# Patient Record
Sex: MALE | Race: WHITE | NOT HISPANIC OR LATINO | Employment: OTHER | URBAN - METROPOLITAN AREA
[De-identification: names, ages, dates, MRNs, and addresses within clinical notes are randomized per-mention and may not be internally consistent; named-entity substitution may affect disease eponyms.]

---

## 2019-02-25 ENCOUNTER — OFFICE VISIT (OUTPATIENT)
Dept: ORTHOPEDIC SURGERY | Facility: CLINIC | Age: 69
End: 2019-02-25

## 2019-02-25 VITALS — TEMPERATURE: 97.1 F | WEIGHT: 171 LBS | HEIGHT: 66 IN | BODY MASS INDEX: 27.48 KG/M2

## 2019-02-25 DIAGNOSIS — M75.02 ADHESIVE CAPSULITIS OF LEFT SHOULDER: ICD-10-CM

## 2019-02-25 DIAGNOSIS — M25.512 CHRONIC LEFT SHOULDER PAIN: Primary | ICD-10-CM

## 2019-02-25 DIAGNOSIS — Z00.00 PATIENT NEEDS MEDICAL HOLD FOR EVALUATION: ICD-10-CM

## 2019-02-25 DIAGNOSIS — G89.29 CHRONIC LEFT SHOULDER PAIN: Primary | ICD-10-CM

## 2019-02-25 PROCEDURE — 73030 X-RAY EXAM OF SHOULDER: CPT | Performed by: ORTHOPAEDIC SURGERY

## 2019-02-25 PROCEDURE — 99204 OFFICE O/P NEW MOD 45 MIN: CPT | Performed by: ORTHOPAEDIC SURGERY

## 2019-02-25 PROCEDURE — 20610 DRAIN/INJ JOINT/BURSA W/O US: CPT | Performed by: ORTHOPAEDIC SURGERY

## 2019-02-25 RX ORDER — MELOXICAM 15 MG/1
TABLET ORAL
Qty: 30 TABLET | Refills: 3 | Status: SHIPPED | OUTPATIENT
Start: 2019-02-25 | End: 2019-03-12

## 2019-02-25 RX ADMIN — LIDOCAINE HYDROCHLORIDE 4 ML: 10 INJECTION, SOLUTION EPIDURAL; INFILTRATION; INTRACAUDAL; PERINEURAL at 16:21

## 2019-02-25 RX ADMIN — METHYLPREDNISOLONE ACETATE 80 MG: 80 INJECTION, SUSPENSION INTRA-ARTICULAR; INTRALESIONAL; INTRAMUSCULAR; SOFT TISSUE at 16:21

## 2019-02-25 NOTE — PROGRESS NOTES
New Left Shoulder      Patient: Akira Reich        YOB: 1950    Medical Record Number: [id number]        Chief Complaints: left shoulder pain  Chief Complaint   Patient presents with   • Left Shoulder - Follow-up, Pain           History of Present Illness: This is a 68-year-old right-hand-dominant patient presents complaining of left shoulder pain is been ongoing for years intermittently much worse recently with loss of range of motion no night pain he is retired he cannot tuck his shirt in his current symptoms are severe intermittent activity dependent with clicking popping worse with activity better with rest he is retired past medical history smart for diabetes and anemia      Allergies: No Known Allergies    Medications:   Home Medications:  No current outpatient medications on file prior to visit.     No current facility-administered medications on file prior to visit.      Current Medications:  Scheduled Meds:  Continuous Infusions:  No current facility-administered medications for this visit.   PRN Meds:.    Past Medical History:   Diagnosis Date   • Anemia    • Diabetes (CMS/HCC)       History reviewed. No pertinent surgical history.     Social History     Occupational History   • Not on file   Tobacco Use   • Smoking status: Never Smoker   • Smokeless tobacco: Never Used   Substance and Sexual Activity   • Alcohol use: No     Frequency: Never   • Drug use: Defer   • Sexual activity: Defer    Social History     Social History Narrative   • Not on file        Family History   Problem Relation Age of Onset   • Diabetes Father              Review of Systems: 14 point review of systems are remarkable for shoulder pain only the remainder negative per the patient    Review of Systems      Physical Exam: 68 y.o. male  General Appearance:    Alert, cooperative, in no acute distress                 Vitals:    02/25/19 1545   Temp: 97.1 °F (36.2 °C)   TempSrc: Temporal   Weight: 77.6 kg (171 lb)  "  Height: 167.6 cm (66\")      Patient is alert and read ×3 no acute distress appears her above-listed at height weight and age.  Affect is normal respiratory rate is normal unlabored. Heart rate regular rate rhythm, sclera, dentition and hearing are normal for the purpose of this exam.    Ortho Exam Physical exam of the left shoulder reveals no overlying skin changes no lymphedema no lymphadenopathy.  Patient has active flexion 150 with mild symptoms passively I can get him to about 160 abduction is similar external rotation is to 0 and internal rotation to his buttocks with  symptoms.  Patient has good rotator cuff strength 4+ over 5 with isometric strength testing with pain.  Patient has a positive impingement and a positive Allan sign.  Patient has good cervical range of motion which is full and asymptomatic no radicular symptoms.  Patient has a normal elbow exam.  Good distal pulses are present      Large Joint Arthrocentesis: L glenohumeral  Date/Time: 2/25/2019 4:21 PM  Consent given by: patient  Site marked: site marked  Timeout: Immediately prior to procedure a time out was called to verify the correct patient, procedure, equipment, support staff and site/side marked as required   Supporting Documentation  Indications: pain   Procedure Details  Location: shoulder - L glenohumeral  Preparation: Patient was prepped and draped in the usual sterile fashion  Needle size: 22 G  Approach: posterior  Medications administered: 80 mg methylPREDNISolone acetate 80 MG/ML; 4 mL lidocaine PF 1% 1 %  Patient tolerance: patient tolerated the procedure well with no immediate complications                Radiology:   AP, Scapular Y and Axillary Lateral of the left shoulder were ordered/reviewed to evauate shoulder pain.  I am no compared to films these show some acromioclavicular arthritis otherwise no acute bony pathology  Imaging Results (most recent)     Procedure Component Value Units Date/Time    XR Shoulder 2+ View " Left [[id number]] Resulted:  02/25/19 1534     Updated:  02/25/19 1534    Impression:       Ordering physician's impression is located in the Encounter Note dated 02/25/19. X-ray performed in the DR room.          Assessment/Plan: Left shoulder pain I think this is a frozen shoulder which I discussed with him in detail plan is to proceed with a glenohumeral injection.  I will start him on Mobic with strict precautions for short period of time.  I will also start him into physical therapy which I think is extremely important.  He is asking for an internal medicine doctor therefore I will put a consult request and for that

## 2019-02-27 RX ORDER — METHYLPREDNISOLONE ACETATE 80 MG/ML
80 INJECTION, SUSPENSION INTRA-ARTICULAR; INTRALESIONAL; INTRAMUSCULAR; SOFT TISSUE
Status: COMPLETED | OUTPATIENT
Start: 2019-02-25 | End: 2019-02-25

## 2019-02-27 RX ORDER — LIDOCAINE HYDROCHLORIDE 10 MG/ML
4 INJECTION, SOLUTION EPIDURAL; INFILTRATION; INTRACAUDAL; PERINEURAL
Status: COMPLETED | OUTPATIENT
Start: 2019-02-25 | End: 2019-02-25

## 2019-03-06 ENCOUNTER — HOSPITAL ENCOUNTER (OUTPATIENT)
Dept: PHYSICAL THERAPY | Facility: HOSPITAL | Age: 69
Setting detail: THERAPIES SERIES
Discharge: HOME | End: 2019-03-06

## 2019-03-06 DIAGNOSIS — M75.02 ADHESIVE CAPSULITIS OF LEFT SHOULDER: ICD-10-CM

## 2019-03-06 DIAGNOSIS — M25.512 CHRONIC LEFT SHOULDER PAIN: Primary | ICD-10-CM

## 2019-03-06 DIAGNOSIS — G89.29 CHRONIC LEFT SHOULDER PAIN: Primary | ICD-10-CM

## 2019-03-06 PROCEDURE — 97162 PT EVAL MOD COMPLEX 30 MIN: CPT | Performed by: PHYSICAL THERAPIST

## 2019-03-06 PROCEDURE — 97110 THERAPEUTIC EXERCISES: CPT | Performed by: PHYSICAL THERAPIST

## 2019-03-06 NOTE — THERAPY EVALUATION
Outpatient Physical Therapy Ortho Initial Evaluation  [facility]     Patient Name: Akira Reich  : 1950  MRN: [id number]  Today's Date: 3/6/2019      Visit Date: 2019    There is no problem list on file for this patient.       Past Medical History:   Diagnosis Date   • Anemia    • Diabetes (CMS/HCC)         No past surgical history on file.    Visit Dx:     ICD-10-CM ICD-9-CM   1. Chronic left shoulder pain M25.512 719.41    G89.29 338.29   2. Adhesive capsulitis of left shoulder M75.02 726.0       Patient History     Row Name 19 1400             History    Chief Complaint  Difficulty with daily activities;Joint stiffness;Pain  -      Date Current Problem(s) Began  18  -      Brief Description of Current Complaint  Pt started with L shoulder pain a while ago, about a year, with no MARLEN. 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 He is Type 2 DM, but reports that he is pretty well controlled. He got an injection and is taking mobic, which is helping  -      Previous treatment for THIS PROBLEM  Injections;Medication  -      Patient/Caregiver Goals  Relieve pain;Return to prior level of function;Improve mobility  -      Patient's Rating of General Health  Good  -      Hand Dominance  left-handed  -      Occupation/sports/leisure activities  retired; kites  -      What clinical tests have you had for this problem?  X-ray  -         Pain     Pain Location  Shoulder  -      Pain at Present  0  -      Pain at Worst  6  -LH      Pain Frequency  Intermittent  -      Pain Description  Tightness  -      What Performance Factors Make the Current Problem(s) WORSE?  sleeping, reaching behind the back  -      What Performance Factors Make the Current Problem(s) BETTER?  injection, rest, mobic  -      Is your sleep disturbed?  -- sometimes  -         Fall Risk Assessment    Any falls in the past year:  No  -LH          Services    Prior Rehab/Home Health Experiences  No  -LH         Daily Activities    Primary Language  Other (comment)  -      Action taken if English not primary language  no  needed  -      Teaching needs identified  Home Exercise Program;Management of Condition  -      Patient is concerned about/has problems with  Difficulty with self care (i.e. bathing, dressing, toileting:;Performing sports, recreation, and play activities;Reaching over head;Repetitive movements of the hand, arm, shoulder  -LH      Does patient have problems with the following?  None  -LH      Barriers to learning  None  -      Pt Participated in POC and Goals  Yes  -LH         Safety    Are you being hurt, hit, or frightened by anyone at home or in your life?  No  -LH      Are you being neglected by a caregiver  No  -        User Key  (r) = Recorded By, (t) = Taken By, (c) = Cosigned By    Initials Name Provider Type     Rosa Maria Higginbotham, PT Physical Therapist          PT Ortho     Row Name 03/06/19 1400       Posture/Observations    Rounded Shoulders  Mild;Increased  -LH       Shoulder Girdle Palpation    Infraspinatus  Left:;Tender  -LH    Teres Minor  Left:;Tender  -LH       Right Upper Ext    Rt Shoulder Abduction AROM  140  -LH    Rt Shoulder Abduction PROM  163 deg  -LH    Rt Shoulder Flexion AROM  150  -LH    Rt Shoulder Flexion PROM  160 deg  -LH    Rt Shoulder External Rotation AROM  T2  -LH    Rt Shoulder External Rotation PROM  80 deg  -LH    Rt Shoulder Internal Rotation AROM  T10  -LH    Rt Shoulder Internal Rotation PROM  78 deg  -LH       Left Upper Ext    Lt Shoulder Abduction AROM  120 deg  -LH    Lt Shoulder Abduction PROM  128 deg  -LH    Lt Shoulder Flexion AROM  140 deg  -LH    Lt Shoulder Flexion PROM  145 deg  -LH    Lt Shoulder External Rotation AROM  T1  -LH    Lt Shoulder External Rotation PROM  58 deg  -LH    Lt Shoulder Internal Rotation AROM  L4  -LH    Lt Shoulder Internal Rotation  PROM  60 deg  -LH    Lt Upper Extremity Comments   mild/mod capsular tightness  -       MMT Right Upper Ext    Rt Shoulder Flexion MMT, Gross Movement  (5/5) normal  -LH    Rt Shoulder ABduction MMT, Gross Movement  (5/5) normal  -LH    Rt Shoulder Internal Rotation MMT, Gross Movement  (4+/5) good plus  -LH    Rt Shoulder External Rotation MMT, Gross Movement  (4+/5) good plus  -LH       MMT Left Upper Ext    Lt Shoulder Flexion MMT, Gross Movement  (4+/5) good plus  -LH    Lt Shoulder ABduction MMT, Gross Movement  (4+/5) good plus  -LH    Lt Shoulder Internal Rotation MMT, Gross Movement  (4+/5) good plus  -LH    Lt Shoulder External Rotation MMT, Gross Movement  (4/5) good  -      User Key  (r) = Recorded By, (t) = Taken By, (c) = Cosigned By    Initials Name Provider Type     Rosa Maria Higginbotham, PT Physical Therapist                      Therapy Education  Given: HEP, Symptoms/condition management, Pain management  Program: New  How Provided: Verbal, Demonstration, Written  Provided to: Patient  Level of Understanding: Teach back education performed, Verbalized, Demonstrated     PT OP Goals     Row Name 03/06/19 1400          PT Short Term Goals    STG 1  Patient will be independent with education for symptom management, joint protection and strategies to minimize stress on affected tissues  -     STG 1 Progress  New  [facility]     STG 2  Pt to improve shoulder A/PROM in flex=145/150 deg, abd=130/138 deg, ER=T2/68 deg and IR=L2/70 deg  -     STG 2 Progress  [facility]        Long Term Goals    LTG 1  Pt to improve shoulder A/PROM in flex=150/155 deg, abd=135/150 deg, ER=75 deg and IR=T12/78 deg  -     LTG 1 Progress  [facility]     LTG 2  Pt to improve pain complaints to no more than 2/10 with ADLs  -     LTG 2 Progress  [facility]     LTG 3  Pt to improve shoulder strength to 4+ to 5/5  -     LTG 3 Progress  [facility]     LTG 4  Pt to improve DASH score from 13% to 8% for overall functional improvement  -      LTG 4 Progress  New  -     LTG 5  Patient will be independent and compliant with advanced home exercise program to facilitate self-management of symptoms.  -     LTG 5 Progress  New  -        Time Calculation    PT Goal Re-Cert Due Date  06/06/19  -       User Key  (r) = Recorded By, (t) = Taken By, (c) = Cosigned By    Initials Name Provider Type     Rosa Maria Higginbotham, PT Physical Therapist          PT Assessment/Plan     Row Name 03/06/19 1500          PT Assessment    Functional Limitations  Limitation in home management;Limitations in community activities;Limitations in functional capacity and performance;Performance in leisure activities;Performance in self-care ADL  -     Impairments  Impaired flexibility;Joint mobility;Muscle strength;Pain;Posture;Range of motion  -     Assessment Comments  Akira Reich is a 68 y.o. year-old male referred to physical therapy for L shoulder pain and stiffness. He presents with a evolving clinical presentation.  He has comorbidities of diabetes that may affect his progress in the plan of care. He has decreased A/PROM in flex=140/145 deg, abd=120/128 deg, ER=T1/58 deg, and IR=L4/60 deg, mild strength deficits, and tightness of the joint capsule. Signs and symptoms are consistent with physical therapy diagnosis of adhesive capsulitis. Pt would benefit from therapy to help improve his ability to perform overhead activities, wash his back, and sleep with less pain.  -     Please refer to paper survey for additional self-reported information  Yes  -     Rehab Potential  Good  -     Patient/caregiver participated in establishment of treatment plan and goals  Yes  -     Patient would benefit from skilled therapy intervention  Yes  -        PT Plan    PT Frequency  2x/week  -     Predicted Duration of Therapy Intervention (Therapy Eval)  6 weeks  -     Planned CPT's?  PT EVAL MOD COMPLELITY: 91433;PT MANUAL THERAPY EA 15 MIN: 64775;PT THER PROC EA 15  MIN: 57843;PT HOT OR COLD PACK TREAT MCARE;PT ULTRASOUND EA 15 MIN: 10667  -     Physical Therapy Interventions (Optional Details)  home exercise program;joint mobilization;manual therapy techniques;modalities;patient/family education;ROM (Range of Motion);strengthening;stretching  -     PT Plan Comments  plan to see pt 2x week for skilled therapy for shoulder ROM, flexibility, and strength  -       User Key  (r) = Recorded By, (t) = Taken By, (c) = Cosigned By    Initials Name Provider Type     Rosa Maria Higginbotham, PT Physical Therapist            Exercises     Row Name 03/06/19 1500             Total Minutes    35306 - PT Therapeutic Exercise Minutes  10  -LH         Exercise 1    Exercise Name 1  table slides  -      Sets 1  1  -LH      Reps 1  10  -LH      Time 1  10 sec  -LH         Exercise 2    Exercise Name 2  AAROM ER with cane  -      Sets 2  1  -LH      Reps 2  10  -LH      Time 2  10 sec  -LH         Exercise 3    Exercise Name 3  supine OH flexion with cane  -      Sets 3  1  -LH      Reps 3  10  -LH      Time 3  10 sec  -LH         Exercise 4    Exercise Name 4  IR stretch with belt  -      Sets 4  1  -LH      Reps 4  5  -LH      Time 4  10 sec  -LH        User Key  (r) = Recorded By, (t) = Taken By, (c) = Cosigned By    Initials Name Provider Type     Rosa Maria Higginbotham, PT Physical Therapist                        Outcome Measure Options: Disabilities of the Arm, Shoulder, and Hand (DASH)  DASH  Open a tight or new jar.: No Difficulty  Write: No Difficulty  Turn a key: No Difficulty  Prepare a meal: No Difficulty  Push open a heavy door: No Difficulty  Place an object on a shelf above your head: No Difficulty  Do heavy household chores (e.g., wash walls, wash floors): No Difficulty  Garden or do yard work: No Difficulty  Make a bed: No Difficulty  Carry a shopping bag or briefcase: No Difficulty  Carry a heavy object (over 10 lbs): No Difficulty  Change a lightbulb overhead: No  Difficulty  Wash or blow dry your hair: No Difficulty  Wash your back: Severe Difficulty  Put on a pullover sweater: No Difficulty  Use a knife to cut food: No Difficulty  Recreational activities in which require little effort (e.g., cardplaying, knitting, etc.): No Difficulty  Recreational Activities in which you move your arm freely (e.g., frisbee, badminton, etc.): Severe Difficulty  Manage transportation needs (getting from one place to another): No Difficulty  Sexual Activities: No Difficulty  During the past week, to what extent has your arm, shoulder, or hand problem interfered with your normal social activites with family, friends, neighbors or groups?: Moderately  During the past week, were you limited in your work or other regular daily activities as a result of your arm, shoulder or hand problem?: Slightly Limited  Arm, Shoulder, or hand pain: Moderate  Arm, shoulder or hand pain when you performed any specific activity: None  Tingling (pins and needles) in your arm, shoulder, or hand: None  Weakness in your arm, shoulder or hand: None  Stiffness in your arm, shoulder or hand: Moderate  During the past week, how much difficulty have you had sleeping because of the pain in your arm, shoulder or hand?: Mild Difficulty  I feel less capable, less confident or less useful because of my arm, shoulder or hand problem: Disagree  DASH Sum : 44  Number of Questions Answered: 29  DASH Score: 12.93         Time Calculation:     Therapy Suggested Charges     Code   Minutes Charges    75979 (CPT®)  Pt Neuromusc Re Education Ea 15 Min      81742 (CPT®) Hc Pt Ther Proc Ea 15 Min 10 1    23830 (CPT®) Hc Gait Training Ea 15 Min      47489 (CPT®) Hc Pt Therapeutic Act Ea 15 Min      67625 (CPT®) Hc Pt Manual Therapy Ea 15 Min      44992 (CPT®) Hc Pt Ther Massage- Per 15 Min      94982 (CPT®)  Pt Iontophoresis Ea 15 Min      50357 (CPT®) Hc Pt Elec Stim Ea-Per 15 Min      75724 (CPT®)  Pt Ultrasound Ea 15 Min       50416 (CPT®) Hc Pt Self Care/Mgmt/Train Ea 15 Min      99141 (CPT®) Hc Pt Prosthetic (S) Train Initial Encounter, Each 15 Min      64212 (CPT®) Hc Orthotic(S) Mgmt/Train Initial Encounter, Each 15min      43162 (CPT®) Hc Pt Aquatic Therapy Ea 15 Min      57271 (CPT®) Hc Pt Orthotic(S)/Prosthetic(S) Encounter, Each 15 Min       (CPT®) Hc Pt Electrical Stim Unattended      Total  10 1          Start Time: 1445  Stop Time: 1523  Time Calculation (min): 38 min  Total Timed Code Minutes- PT: 38 minute(s)     Therapy Charges for Today     Code Description Service Date Service Provider Modifiers Qty    33925310283 HC PT THER PROC EA 15 MIN 3/6/2019 Rosa Maria Higginbotham, PT GP 1    23605207659 HC PT EVAL MOD COMPLEXITY 2 3/6/2019 Rosa Maria Higginbotham, PT GP 1          PT G-Codes  Outcome Measure Options: Disabilities of the Arm, Shoulder, and Hand (DASH)         Rosa Maria Higginbotham, PT  3/6/2019

## 2019-03-12 ENCOUNTER — OFFICE VISIT (OUTPATIENT)
Dept: INTERNAL MEDICINE | Age: 69
End: 2019-03-12

## 2019-03-12 VITALS
BODY MASS INDEX: 27.48 KG/M2 | RESPIRATION RATE: 16 BRPM | HEIGHT: 66 IN | DIASTOLIC BLOOD PRESSURE: 64 MMHG | WEIGHT: 171 LBS | TEMPERATURE: 98.7 F | SYSTOLIC BLOOD PRESSURE: 118 MMHG | HEART RATE: 86 BPM | OXYGEN SATURATION: 98 %

## 2019-03-12 DIAGNOSIS — Z76.89 ENCOUNTER TO ESTABLISH CARE: Primary | ICD-10-CM

## 2019-03-12 DIAGNOSIS — Z00.00 PREVENTATIVE HEALTH CARE: ICD-10-CM

## 2019-03-12 DIAGNOSIS — Z12.11 ENCOUNTER FOR SCREENING COLONOSCOPY: ICD-10-CM

## 2019-03-12 PROCEDURE — 99203 OFFICE O/P NEW LOW 30 MIN: CPT | Performed by: NURSE PRACTITIONER

## 2019-03-12 RX ORDER — ATORVASTATIN CALCIUM 10 MG/1
10 TABLET, FILM COATED ORAL DAILY
COMMUNITY

## 2019-03-12 RX ORDER — ASPIRIN 81 MG/1
81 TABLET ORAL DAILY
COMMUNITY

## 2019-03-12 NOTE — PROGRESS NOTES
Akira Reich / 68 y.o. / male  Encounter Date: 03/12/2019    ASSESSMENT & PLAN:    Problem List Items Addressed This Visit     None      Visit Diagnoses     Encounter to establish care    -  Primary    Preventative health care        Relevant Orders    CBC & Differential    Comprehensive Metabolic Panel    Hemoglobin A1c    Lipid Panel With / Chol / HDL Ratio    Hepatitis C Antibody    PSA Screen    Encounter for screening colonoscopy        Relevant Orders    Ambulatory Referral For Screening Colonoscopy        Orders Placed This Encounter   Procedures   • Comprehensive Metabolic Panel   • Hemoglobin A1c   • Lipid Panel With / Chol / HDL Ratio   • Hepatitis C Antibody   • PSA Screen   • Ambulatory Referral For Screening Colonoscopy   • CBC & Differential     No orders of the defined types were placed in this encounter.      Summary/Discussion:  1. He is due for annual physical with fasting labs: CBC, CMP, A1c, lipid panel, PSA, Hep C screen.  Ordered today because patient is fasting, per request.  2.  Educated patient regarding screening colonoscopy ordered today, patient is overdue.  Discussed primary prevention recommendation and support, procedure process, and expected post-care.  Patient resistant to but agreed to colonoscopy.  3. Recommended stopping ASA based on recent ASA trials for primary prevention (ASPREE, ASCEND, ARRIVE).  4. Discussed history of elevated cholesterol, will check today.  Patient reports he only takes atorvastatin every other day, although it is scheduled daily. Unable to recall who started him on atorvastatin and why.  Reassess need with lab results.  5. He has been told in the past that he is at risk for diabetes, no historical labs available.  A1c checked today, treat as indicated.    Return in about 4 weeks (around 4/9/2019) for Annual physical.  ________________________________________________________________    VITALS:    Visit Vitals  /64   Pulse 86   Temp 98.7 °F (37.1  "°C) (Temporal)   Resp 16   Ht 167.6 cm (66\")   Wt 77.6 kg (171 lb)   SpO2 98%   BMI 27.60 kg/m²       BP Readings from Last 3 Encounters:   03/12/19 118/64     Wt Readings from Last 3 Encounters:   03/12/19 77.6 kg (171 lb)   02/25/19 77.6 kg (171 lb)      Body mass index is 27.6 kg/m².    CC: Main reason(s) for today's visit: Establish Care (new patient )    HPI    Patient is a 68 y.o. male who is here to establish care.  No record of primary care visits in greater than 2 years. Reports he has been \"seen\" in medical facilities, but he was unable to articulate if it was primary care or not. Some cultural barrier present regarding understanding of medical conditions, recommended treatment (ie: lipitor) and primary prevention. He states he wants a \"full checkup\" today including prostate. NO acute concerns today.     Patient Care Team:  Ruthie Dsouza APRN as PCP - General (Nurse Practitioner)  ____________________________________________________________________    REVIEW OF SYSTEMS    Review of Systems   Constitutional: Positive for fatigue (doesnt always feel rested when he wakes up, every 3 days or so). Negative for appetite change and unexpected weight change.   HENT: Negative.    Respiratory: Negative.    Cardiovascular: Negative.    Gastrointestinal: Positive for constipation (takes fiber daily). Negative for abdominal pain, blood in stool, nausea and vomiting.   Genitourinary: Positive for frequency (2-3 x per night). Negative for difficulty urinating and urgency.   Musculoskeletal: Negative.    Neurological: Positive for dizziness (occasional).   Psychiatric/Behavioral: Negative.      PHYSICAL EXAMINATION    Physical Exam   Constitutional: He is oriented to person, place, and time. He appears well-developed and well-nourished.   Overweight     Cardiovascular: Normal rate, regular rhythm and normal heart sounds.   Pulmonary/Chest: Effort normal and breath sounds normal.   Musculoskeletal: Normal range of motion. " "He exhibits no edema.   Neurological: He is alert and oriented to person, place, and time.   Psychiatric: He has a normal mood and affect. His behavior is normal.   Vitals reviewed.    REVIEWED DATA:    Labs:   No results found for: NA, K, AST, ALT, BUN, CREATININE, EGFRIFNONA, EGFRIFAFRI    No results found for: GLUCOSE, HGBA1C, MICROALBUR    No results found for: LDL, HDL, TRIG, CHOLHDLRATIO    No results found for: TSH, FREET4     No results found for: WBC, HGB, PLT      Imaging:      Medical Tests:      Summary of old records / correspondence / consultant report:      Request outside records:   ______________________________________________________________________    ALLERGIES  No Known Allergies     MEDICATIONS  Current Outpatient Medications on File Prior to Visit   Medication Sig   • aspirin 81 MG EC tablet Take 81 mg by mouth Daily.   • atorvastatin (LIPITOR) 10 MG tablet Take 10 mg by mouth Daily. Pt is taking this every 2 days   • [DISCONTINUED] meloxicam (MOBIC) 15 MG tablet 1 PO Daily with food.     No current facility-administered medications on file prior to visit.        PFSH:     The following portions of the patient's history were reviewed and updated as appropriate: Allergies / Current Medications / Past Medical History / Surgical History / Social History / Family History    PROBLEM LIST   There is no problem list on file for this patient.      PAST MEDICAL HISTORY  Past Medical History:   Diagnosis Date   • Anemia    • Diabetes (CMS/HCC)     prior doctor said \"watch for diabetes with diet\"   • Hyperlipidemia        SURGICAL HISTORY  Past Surgical History:   Procedure Laterality Date   • APPENDECTOMY  2004       SOCIAL HISTORY  Social History     Socioeconomic History   • Marital status:      Spouse name: Not on file   • Number of children: Not on file   • Years of education: Not on file   • Highest education level: Not on file   Tobacco Use   • Smoking status: Never Smoker   • Smokeless " tobacco: Never Used   Substance and Sexual Activity   • Alcohol use: No     Frequency: Never   • Drug use: Defer   • Sexual activity: Yes       FAMILY HISTORY  Family History   Problem Relation Age of Onset   • Diabetes Father    • Stroke Father    • Hypertension Father    • No Known Problems Mother    • No Known Problems Sister    • No Known Problems Brother    • No Known Problems Brother    • No Known Problems Brother    • No Known Problems Brother    • No Known Problems Sister    • No Known Problems Sister    • No Known Problems Sister    • Prostate cancer Neg Hx    • Colon cancer Neg Hx

## 2019-03-13 ENCOUNTER — HOSPITAL ENCOUNTER (OUTPATIENT)
Dept: PHYSICAL THERAPY | Facility: HOSPITAL | Age: 69
Setting detail: THERAPIES SERIES
Discharge: HOME | End: 2019-03-13

## 2019-03-13 DIAGNOSIS — M75.02 ADHESIVE CAPSULITIS OF LEFT SHOULDER: ICD-10-CM

## 2019-03-13 DIAGNOSIS — G89.29 CHRONIC LEFT SHOULDER PAIN: Primary | ICD-10-CM

## 2019-03-13 DIAGNOSIS — M25.512 CHRONIC LEFT SHOULDER PAIN: Primary | ICD-10-CM

## 2019-03-13 LAB
ALBUMIN SERPL-MCNC: 4.7 G/DL (ref 3.5–5.2)
ALBUMIN/GLOB SERPL: 1.7 G/DL
ALP SERPL-CCNC: 60 U/L (ref 39–117)
ALT SERPL-CCNC: 27 U/L (ref 1–41)
AST SERPL-CCNC: 19 U/L (ref 1–40)
BASOPHILS # BLD AUTO: 0.06 10*3/MM3 (ref 0–0.2)
BASOPHILS NFR BLD AUTO: 1.1 % (ref 0–1.5)
BILIRUB SERPL-MCNC: 0.3 MG/DL (ref 0.1–1.2)
BUN SERPL-MCNC: 19 MG/DL (ref 8–23)
BUN/CREAT SERPL: 19.2 (ref 7–25)
CALCIUM SERPL-MCNC: 9.9 MG/DL (ref 8.6–10.5)
CHLORIDE SERPL-SCNC: 103 MMOL/L (ref 98–107)
CHOLEST SERPL-MCNC: 162 MG/DL (ref 0–200)
CHOLEST/HDLC SERPL: 2.19 {RATIO}
CO2 SERPL-SCNC: 25.6 MMOL/L (ref 22–29)
CREAT SERPL-MCNC: 0.99 MG/DL (ref 0.76–1.27)
EOSINOPHIL # BLD AUTO: 0.06 10*3/MM3 (ref 0–0.4)
EOSINOPHIL NFR BLD AUTO: 1.1 % (ref 0.3–6.2)
ERYTHROCYTE [DISTWIDTH] IN BLOOD BY AUTOMATED COUNT: 16.1 % (ref 12.3–15.4)
GLOBULIN SER CALC-MCNC: 2.8 GM/DL
GLUCOSE SERPL-MCNC: 114 MG/DL (ref 65–99)
HBA1C MFR BLD: 6.3 % (ref 4.8–5.6)
HCT VFR BLD AUTO: 44.6 % (ref 37.5–51)
HCV AB S/CO SERPL IA: <0.1 S/CO RATIO (ref 0–0.9)
HDLC SERPL-MCNC: 74 MG/DL (ref 40–60)
HGB BLD-MCNC: 13.3 G/DL (ref 13–17.7)
IMM GRANULOCYTES # BLD AUTO: 0.02 10*3/MM3 (ref 0–0.05)
IMM GRANULOCYTES NFR BLD AUTO: 0.4 % (ref 0–0.5)
LDLC SERPL CALC-MCNC: 78 MG/DL (ref 0–100)
LYMPHOCYTES # BLD AUTO: 1.88 10*3/MM3 (ref 0.7–3.1)
LYMPHOCYTES NFR BLD AUTO: 33.8 % (ref 19.6–45.3)
MCH RBC QN AUTO: 22.4 PG (ref 26.6–33)
MCHC RBC AUTO-ENTMCNC: 29.8 G/DL (ref 31.5–35.7)
MCV RBC AUTO: 75.2 FL (ref 79–97)
MONOCYTES # BLD AUTO: 0.49 10*3/MM3 (ref 0.1–0.9)
MONOCYTES NFR BLD AUTO: 8.8 % (ref 5–12)
NEUTROPHILS # BLD AUTO: 3.06 10*3/MM3 (ref 1.4–7)
NEUTROPHILS NFR BLD AUTO: 54.8 % (ref 42.7–76)
PLATELET # BLD AUTO: 222 10*3/MM3 (ref 140–450)
POTASSIUM SERPL-SCNC: 4.8 MMOL/L (ref 3.5–5.2)
PROT SERPL-MCNC: 7.5 G/DL (ref 6–8.5)
PSA SERPL-MCNC: 1.06 NG/ML (ref 0–4)
RBC # BLD AUTO: 5.93 10*6/MM3 (ref 4.14–5.8)
SODIUM SERPL-SCNC: 141 MMOL/L (ref 136–145)
TRIGL SERPL-MCNC: 49 MG/DL (ref 0–150)
VLDLC SERPL CALC-MCNC: 9.8 MG/DL (ref 5–40)
WBC # BLD AUTO: 5.38 10*3/MM3 (ref 3.4–10.8)

## 2019-03-13 PROCEDURE — 97140 MANUAL THERAPY 1/> REGIONS: CPT

## 2019-03-13 PROCEDURE — 97110 THERAPEUTIC EXERCISES: CPT

## 2019-03-13 NOTE — THERAPY TREATMENT NOTE
"    Outpatient Physical Therapy Ortho Treatment Note  [facility]     Patient Name: Akira Reich  : 1950  MRN: [id number]  Today's Date: 3/13/2019      Visit Date: 2019    Visit Dx:    ICD-10-CM ICD-9-CM   1. Chronic left shoulder pain M25.512 719.41    G89.29 338.29   2. Adhesive capsulitis of left shoulder M75.02 726.0       There is no problem list on file for this patient.       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  -SI       Subjective Pain    Able to rate subjective pain?  yes  -SI    Pre-Treatment Pain Level  0  -SI    Post-Treatment Pain Level  0  -SI    Subjective Pain Comment  Very pleased not having left shoulder pain.  -SI      User Key  (r) = Recorded By, (t) = Taken By, (c) = Cosigned By    Initials Name Provider Type    Leona Varghese PTA Physical Therapy Assistant                      PT Assessment/Plan     Row Name 19 1625          PT Assessment    Assessment Comments  ROM is better and he is having no pain with ADL's.  Initially he felt he wanted to DC PT but felt after tx need one more session to monitor progress with ROM.  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  -SI        Subjective Pain    Able to rate subjective pain?  --  yes  -SI     Pre-Treatment Pain Level  --  0  -SI     Post-Treatment Pain Level  --  0  -SI     Subjective Pain Comment  --  Very pleased not having left shoulder pain.  -SI        Total Minutes    23724 - PT Therapeutic Exercise Minutes  --  20  -SI     62792 - PT Manual Therapy Minutes  --  25  -SI        Exercise 1    Exercise Name 1  table slides  -SI  table slides  -SI     Sets 1  1  -SI  1  -SI     Reps 1  10  -SI  10  -SI     Time 1  10 sec  -SI  10 sec  -SI        Exercise 2    Exercise Name 2  AAROM ER with cane  -SI  AAROM ER with cane  -SI     Sets 2  1  -SI  1  -SI     Reps 2  10  -SI  10  -SI     Time 2  10 sec  -SI  10 sec  -SI        Exercise 3    Exercise Name 3  supine OH flexion with cane  -SI  supine OH flexion with cane  -SI     Sets 3  1  -SI  1  -SI     Reps 3  10  -SI  10  -SI     Time 3  10 sec  -SI  10 sec  -SI        Exercise 4    Exercise Name 4  IR stretch with belt  -SI  IR stretch with belt  -SI     Sets 4  1  -SI  1  -SI     Reps 4  5  -SI  5  -SI     Time 4  10 sec  -SI  10 sec  -SI        Exercise 5    Exercise Name 5  ER with green TB standing  -SI  --     Sets 5  2  -SI  --     Reps 5  10  -SI  --       User Key  (r) = Recorded By, (t) = Taken By, (c) = Cosigned By    Initials Name Provider Type    Leona Varghese, PTA Physical Therapy Assistant                        Manual Rx (last 36 hours)      Manual Treatments     Row Name 03/13/19 1500             Total Minutes    98576 - PT Manual Therapy Minutes  25  -SI         Manual Rx 1    Manual Rx 1 Location  left shoulder  -SI      Manual Rx 1 Type  PROM, GH joint mobs and  stretching  -SI      Manual Rx 1 Duration  25  -SI        User Key  (r) = Recorded By, (t) = Taken By, (c) =  Cosigned By    Initials Name Provider Type    Leona Varghese PTA Physical Therapy Assistant              Therapy Education  Given: HEP, Symptoms/condition management  Program: Progressed  How Provided: Verbal  Provided to: Patient  Level of Understanding: Teach back education performed              Time Calculation:   Start Time: 1530  Stop Time: 1615  Time Calculation (min): 45 min  Total Timed Code Minutes- PT: 45 minute(s)  Therapy Suggested Charges     Code   Minutes Charges    46625 (CPT®) Hc Pt Neuromusc Re Education Ea 15 Min      47566 (CPT®) Hc Pt Ther Proc Ea 15 Min 20 1    81777 (CPT®) Hc Gait Training Ea 15 Min      50987 (CPT®) Hc Pt Therapeutic Act Ea 15 Min      05810 (CPT®) Hc Pt Manual Therapy Ea 15 Min 25 2    24071 (CPT®) Hc Pt Ther Massage- Per 15 Min      71447 (CPT®) Hc Pt Iontophoresis Ea 15 Min      73957 (CPT®) Hc Pt Elec Stim Ea-Per 15 Min      98309 (CPT®) Hc Pt Ultrasound Ea 15 Min      22841 (CPT®) Hc Pt Self Care/Mgmt/Train Ea 15 Min      29036 (CPT®) Hc Pt Prosthetic (S) Train Initial Encounter, Each 15 Min      46116 (CPT®) Hc Orthotic(S) Mgmt/Train Initial Encounter, Each 15min      71551 (CPT®) Hc Pt Aquatic Therapy Ea 15 Min      80731 (CPT®) Hc Pt Orthotic(S)/Prosthetic(S) Encounter, Each 15 Min       (CPT®) Hc Pt Electrical Stim Unattended      Total  45 3        Therapy Charges for Today     Code Description Service Date Service Provider Modifiers Qty    14897573899 HC PT THER PROC EA 15 MIN 3/13/2019 Leona Schwarz PTA GP 1    62654338059 HC PT MANUAL THERAPY EA 15 MIN 3/13/2019 Leona Schwarz PTA GP 2                    Leona Schwarz PTA  3/13/2019

## 2019-03-15 DIAGNOSIS — R79.89 ABNORMAL COMPLETE BLOOD COUNT: Primary | ICD-10-CM

## 2019-03-21 ENCOUNTER — HOSPITAL ENCOUNTER (OUTPATIENT)
Dept: PHYSICAL THERAPY | Facility: HOSPITAL | Age: 69
Setting detail: THERAPIES SERIES
Discharge: HOME | End: 2019-03-21

## 2019-03-21 DIAGNOSIS — G89.29 CHRONIC LEFT SHOULDER PAIN: Primary | ICD-10-CM

## 2019-03-21 DIAGNOSIS — M25.512 CHRONIC LEFT SHOULDER PAIN: Primary | ICD-10-CM

## 2019-03-21 DIAGNOSIS — M75.02 ADHESIVE CAPSULITIS OF LEFT SHOULDER: ICD-10-CM

## 2019-03-21 PROCEDURE — 97140 MANUAL THERAPY 1/> REGIONS: CPT

## 2019-03-21 PROCEDURE — 97110 THERAPEUTIC EXERCISES: CPT

## 2019-03-21 NOTE — THERAPY TREATMENT NOTE
"    Outpatient Physical Therapy Ortho Treatment Note  [facility]     Patient Name: Akira Reich  : 1950  MRN: [id number]  Today's Date: 3/21/2019      Visit Date: 2019    Visit Dx:    ICD-10-CM ICD-9-CM   1. Chronic left shoulder pain M25.512 719.41    G89.29 338.29   2. Adhesive capsulitis of left shoulder M75.02 726.0       There is no problem list on file for this patient.       Past Medical History:   Diagnosis Date   • Anemia    • Diabetes (CMS/HCC)     prior doctor said \"watch for diabetes with diet\"   • Hyperlipidemia         Past Surgical History:   Procedure Laterality Date   • APPENDECTOMY         PT Ortho     Row Name 19 1600       Subjective Comments    Subjective Comments  Pt reports no shoulder pain with ADL's.    -SI       Subjective Pain    Able to rate subjective pain?  yes  -SI    Pre-Treatment Pain Level  0  -SI    Post-Treatment Pain Level  0  -SI    Subjective Pain Comment  sub-acute pain left ER with stretching  -SI       Right Upper Ext    Rt Shoulder Abduction AROM  150  -SI    Rt Shoulder Abduction PROM  160  -SI    Rt Shoulder Flexion AROM  155  -SI    Rt Shoulder Flexion PROM  160  -SI    Rt Shoulder External Rotation AROM  T2  -SI    Rt Shoulder External Rotation PROM  80  -SI    Rt Shoulder Internal Rotation AROM  T10  -SI       Left Upper Ext    Lt Shoulder Abduction AROM  150  -SI    Lt Shoulder Abduction PROM  150  -SI    Lt Shoulder Flexion AROM  150  -SI    Lt Shoulder Flexion PROM  155  -SI    Lt Shoulder External Rotation AROM  T2  -SI    Lt Shoulder External Rotation PROM  70  -SI    Lt Shoulder Internal Rotation AROM  T12  -SI    Lt Shoulder Internal Rotation PROM  70  -SI       MMT Left Upper Ext    Lt Shoulder Flexion MMT, Gross Movement  (4+/5) good plus  -SI    Lt Shoulder ABduction MMT, Gross Movement  (4+/5) good plus  -SI    Lt Shoulder Internal Rotation MMT, Gross Movement  (4+/5) good plus  -SI    Lt Shoulder External Rotation MMT, Gross " Movement  (4+/5) good plus  -SI      User Key  (r) = Recorded By, (t) = Taken By, (c) = Cosigned By    Initials Name Provider Type    Leona Varghese PTA Physical Therapy Assistant                      PT Assessment/Plan     Row Name 03/21/19 1624          PT Assessment    Assessment Comments  Pt feels ind with his stretching and feels he doesn't need more PT.  Says he will stretch BID.  -SI       User Key  (r) = Recorded By, (t) = Taken By, (c) = Cosigned By    Initials Name Provider Type    Leona Varghese PTA Physical Therapy Assistant            Exercises     Row Name 03/21/19 1600 03/21/19 1500          Subjective Comments    Subjective Comments  Pt reports no shoulder pain with ADL's.    -SI  --        Subjective Pain    Able to rate subjective pain?  yes  -SI  --     Pre-Treatment Pain Level  0  -SI  --     Post-Treatment Pain Level  0  -SI  --     Subjective Pain Comment  sub-acute pain left ER with stretching  -SI  --        Total Minutes    17507 - PT Therapeutic Exercise Minutes  20  -SI  --     59753 - PT Manual Therapy Minutes  --  25  -SI        Exercise 1    Exercise Name 1  table slides  -SI  --     Sets 1  1  -SI  --     Reps 1  10  -SI  --     Time 1  10 sec  -SI  --        Exercise 2    Exercise Name 2  AAROM ER with cane  -SI  --     Sets 2  1  -SI  --     Reps 2  10  -SI  --     Time 2  10 sec  -SI  --        Exercise 3    Exercise Name 3  supine OH flexion with cane  -SI  --     Sets 3  1  -SI  --     Reps 3  10  -SI  --     Time 3  10 sec  -SI  --        Exercise 4    Exercise Name 4  IR stretch with belt  -SI  --     Sets 4  1  -SI  --     Reps 4  5  -SI  --     Time 4  10 sec  -SI  --        Exercise 5    Exercise Name 5  ER with green TB standing  -SI  --     Sets 5  2  -SI  --     Reps 5  10  -SI  --       User Key  (r) = Recorded By, (t) = Taken By, (c) = Cosigned By    Initials Name Provider Type    Leona Varghese PTA Physical Therapy Assistant                      Manual Rx  (last 36 hours)      Manual Treatments     Row Name 03/21/19 1500             Total Minutes    27912 - PT Manual Therapy Minutes  25  -SI         Manual Rx 1    Manual Rx 1 Location  left shoulder  -SI      Manual Rx 1 Type  PROM, GH joint mobs and  stretching  -SI      Manual Rx 1 Duration  25  -SI        User Key  (r) = Recorded By, (t) = Taken By, (c) = Cosigned By    Initials Name Provider Type    Leona Varghese PTA Physical Therapy Assistant          PT OP Goals     Row Name 03/21/19 1625          PT Short Term Goals    STG 1  Patient will be independent with education for symptom management, joint protection and strategies to minimize stress on affected tissues  -SI     STG 1 Progress  Met  -SI     STG 2  Pt to improve shoulder A/PROM in flex=145/150 deg, abd=130/138 deg, ER=T2/68 deg and IR=L2/70 deg  -SI     STG 2 Progress  Partially Met met for all except ER  -SI        Long Term Goals    LTG 1  Pt to improve shoulder A/PROM in flex=150/155 deg, abd=135/150 deg, ER=75 deg and IR=T12/78 deg  -SI     LTG 1 Progress  Partially Met  -SI     LTG 2  Pt to improve pain complaints to no more than 2/10 with ADLs  -SI     LTG 2 Progress  Met  -SI     LTG 3  Pt to improve shoulder strength to 4+ to 5/5  -SI     LTG 3 Progress  Met  -SI     LTG 4  Pt to improve DASH score from 13% to 8% for overall functional improvement  -SI     LTG 4 Progress  New not repeated  -SI     LTG 5  Patient will be independent and compliant with advanced home exercise program to facilitate self-management of symptoms.  -SI     LTG 5 Progress  Met  -SI       User Key  (r) = Recorded By, (t) = Taken By, (c) = Cosigned By    Initials Name Provider Type    Leona Varghese PTA Physical Therapy Assistant          Therapy Education  Given: HEP, Symptoms/condition management  Program: Reinforced  How Provided: Verbal, Demonstration, Written  Provided to: Patient  Level of Understanding: Teach back education performed              Time  Calculation:   Start Time: 1545  Stop Time: 1630  Time Calculation (min): 45 min  Total Timed Code Minutes- PT: 45 minute(s)  Therapy Charges for Today     Code Description Service Date Service Provider Modifiers Qty    36303920669 HC PT THER PROC EA 15 MIN 3/21/2019 Leona Schwarz, PTA GP 1    9195123 HC PT MANUAL THERAPY EA 15 MIN 3/21/2019 Leona Schwarz PTA GP 2                    Leona Schwarz PTA  3/21/2019

## 2019-03-25 ENCOUNTER — OFFICE VISIT (OUTPATIENT)
Dept: ORTHOPEDIC SURGERY | Facility: CLINIC | Age: 69
End: 2019-03-25

## 2019-03-25 VITALS — WEIGHT: 170.6 LBS | HEIGHT: 66 IN | TEMPERATURE: 99.4 F | BODY MASS INDEX: 27.42 KG/M2

## 2019-03-25 DIAGNOSIS — M75.02 ADHESIVE CAPSULITIS OF LEFT SHOULDER: Primary | ICD-10-CM

## 2019-03-25 PROCEDURE — 99212 OFFICE O/P EST SF 10 MIN: CPT | Performed by: ORTHOPAEDIC SURGERY

## 2019-03-25 NOTE — PROGRESS NOTES
"Left Shoulder Follow Up      Patient: Akira Reich        YOB: 1950            Chief Complaints: left Shoulder pain  Chief Complaint   Patient presents with   • Left Shoulder - Follow-up, Pain           History of Present Illness: Patient is here follow-up of left shoulder pain he was felt to have a frozen shoulder last visit we injected him starting physical therapy he states he is doing great he has about 5% of his original symptoms remaining overall is doing well    Physical Exam: 68 y.o. male  General Appearance:    Alert, cooperative, in no acute distress                   Vitals:    03/25/19 1441   Temp: 99.4 °F (37.4 °C)   TempSrc: Temporal   Weight: 77.4 kg (170 lb 9.6 oz)   Height: 167.6 cm (66\")        Patient is alert and read ×3 no acute distress appears her above-listed at height weight and age.  Affect is normal respiratory rate is normal unlabored. Heart rate regular rate rhythm, sclera, dentition and hearing are normal for the purpose of this exam.      Ortho Exam    Physical exam of the left shoulder reveals no overlying skin changes no lymphedema no lymphadenopathy.  The patient can actively flex to 180, abduction is similar external rotation is to 50, internal rotation to the LOWer lumbar spine.  Rotator cuff strength is 4+ to 5 over 5 with isometric strength testing without symptoms.  The patient has good cervical range of motion full and asymptomatic no radicular symptoms and a normal elbow exam.  Patient has good distal pulses          Assessment/Plan:    Frozen shoulder on the left overall he is doing great still very mildly limited in internal rotation but she will continue to work on as long as he continues to progress he can follow-up as needed            "

## 2019-03-27 ENCOUNTER — DOCUMENTATION (OUTPATIENT)
Dept: PHYSICAL THERAPY | Facility: HOSPITAL | Age: 69
End: 2019-03-27

## 2019-03-27 DIAGNOSIS — M75.02 ADHESIVE CAPSULITIS OF LEFT SHOULDER: ICD-10-CM

## 2019-03-27 DIAGNOSIS — G89.29 CHRONIC LEFT SHOULDER PAIN: Primary | ICD-10-CM

## 2019-03-27 DIAGNOSIS — M25.512 CHRONIC LEFT SHOULDER PAIN: Primary | ICD-10-CM

## 2019-03-27 NOTE — THERAPY DISCHARGE NOTE
Outpatient Physical Therapy Discharge Summary         Patient Name: Akira Reich  : 1950  MRN: [id number]    Today's Date: 3/27/2019    Visit Dx:    ICD-10-CM ICD-9-CM   1. Chronic left shoulder pain M25.512 719.41    G89.29 338.29   2. Adhesive capsulitis of left shoulder M75.02 726.0       PT OP Goals     Row Name 19 1000          PT Short Term Goals    STG 1  Patient will be independent with education for symptom management, joint protection and strategies to minimize stress on affected tissues  -SI     STG 1 Progress  Met  -SI     STG 2  Pt to improve shoulder A/PROM in flex=145/150 deg, abd=130/138 deg, ER=T2/68 deg and IR=L2/70 deg  -SI     STG 2 Progress  Partially Met met for all except ER  -SI        Long Term Goals    LTG 1  Pt to improve shoulder A/PROM in flex=150/155 deg, abd=135/150 deg, ER=75 deg and IR=T12/78 deg  -SI     LTG 1 Progress  Partially Met  -SI     LTG 2  Pt to improve pain complaints to no more than 2/10 with ADLs  -SI     LTG 2 Progress  Met  -SI     LTG 3  Pt to improve shoulder strength to 4+ to 5/5  -SI     LTG 3 Progress  Met  -SI     LTG 4  Pt to improve DASH score from 13% to 8% for overall functional improvement  -SI     LTG 4 Progress  New not repeated  -SI     LTG 5  Patient will be independent and compliant with advanced home exercise program to facilitate self-management of symptoms.  -SI     LTG 5 Progress  Met  -SI       User Key  (r) = Recorded By, (t) = Taken By, (c) = Cosigned By    Initials Name Provider Type    Leona Varghese PTA Physical Therapy Assistant          OP PT Discharge Summary  Date of Discharge: 19  Reason for Discharge: Independent  Outcomes Achieved: Patient able to partially acheive established goals  Discharge Destination: Home with home program  Discharge Instructions/Additional Comments: HEP for daily stretching, with good posture      Time Calculation:                    Leona Schwarz PTA  3/27/2019

## 2019-04-02 ENCOUNTER — APPOINTMENT (OUTPATIENT)
Dept: ONCOLOGY | Facility: CLINIC | Age: 69
End: 2019-04-02

## 2019-04-02 ENCOUNTER — APPOINTMENT (OUTPATIENT)
Dept: OTHER | Facility: HOSPITAL | Age: 69
End: 2019-04-02

## 2019-04-05 NOTE — PROGRESS NOTES
"  Akira Reich / 68 y.o. / male  Encounter Date: 04/09/2019    CC: No chief complaint on file.    HPI:      kAira presents for annual health maintenance visit.    · Last health maintenance visit: >5 years ago  · General health: fair  · Lifestyle:  · Attempting to lose weight?: Yes   · Diet: good  · Exercise: adequate/fair  · Tobacco: Never used   · Alcohol: does not drink  · Work: Retired  · Reproductive health:  · Sexually active?: Yes   · Concern for STD?: No   · Sexual problems?: No problems   · Sees Urologist?: No   · Depression Screening:    No flowsheet data found.      PHQ-2: 0 (Not depressed)     PHQ-9: 0 (Negative screening for depression)    Patient Care Team:  Ruthie Dsouza APRN as PCP - General (Nurse Practitioner)  Ruthie Dsouza APRN as Referring Physician (Nurse Practitioner)  Aric Valerio MD as Consulting Physician (Hematology and Oncology)  ______________________________________________________________________    ALLERGIES  No Known Allergies     MEDICATIONS  Current Outpatient Medications on File Prior to Visit   Medication Sig   • aspirin 81 MG EC tablet Take 81 mg by mouth Daily.   • atorvastatin (LIPITOR) 10 MG tablet Take 10 mg by mouth Daily. Pt is taking this every 2 days     No current facility-administered medications on file prior to visit.        PFSH:     The following portions of the patient's history were reviewed and updated as appropriate: Allergies / Current Medications / Past Medical History / Surgical History / Social History / Family History    PROBLEM LIST   There is no problem list on file for this patient.      PAST MEDICAL HISTORY  Past Medical History:   Diagnosis Date   • Anemia    • Diabetes (CMS/HCC)     prior doctor said \"watch for diabetes with diet\"   • Hyperlipidemia        SURGICAL HISTORY  Past Surgical History:   Procedure Laterality Date   • APPENDECTOMY  2004       SOCIAL HISTORY  Social History     Socioeconomic History   • Marital status:      " Spouse name: Not on file   • Number of children: Not on file   • Years of education: Not on file   • Highest education level: Not on file   Occupational History     Employer: RETIRED   Tobacco Use   • Smoking status: Never Smoker   • Smokeless tobacco: Never Used   Substance and Sexual Activity   • Alcohol use: No     Frequency: Never   • Drug use: Defer   • Sexual activity: Yes       FAMILY HISTORY  Family History   Problem Relation Age of Onset   • Diabetes Father    • Stroke Father    • Hypertension Father    • No Known Problems Mother    • No Known Problems Sister    • No Known Problems Brother    • No Known Problems Brother    • No Known Problems Brother    • No Known Problems Brother    • No Known Problems Sister    • No Known Problems Sister    • No Known Problems Sister    • Prostate cancer Neg Hx    • Colon cancer Neg Hx        IMMUNIZATION HISTORY    There is no immunization history on file for this patient.    ______________________________________________________________________    REVIEW OF SYSTEMS    Review of Systems   Constitutional: Negative for activity change, appetite change, fatigue and unexpected weight change.   HENT: Negative.    Eyes: Negative.    Respiratory: Negative.    Cardiovascular: Negative.    Gastrointestinal: Negative.    Genitourinary: Negative.    Musculoskeletal: Negative.  Negative for arthralgias and myalgias.   Neurological: Positive for dizziness (occasional). Negative for headaches.   Psychiatric/Behavioral: Positive for sleep disturbance (frequently feels tired, un-rested).     VITALS:    There were no vitals taken for this visit.    BP Readings from Last 3 Encounters:   03/12/19 118/64     Wt Readings from Last 3 Encounters:   03/25/19 77.4 kg (170 lb 9.6 oz)   03/12/19 77.6 kg (171 lb)   02/25/19 77.6 kg (171 lb)      There is no height or weight on file to calculate BMI.    PHYSICAL EXAMINATION    Physical Exam   Constitutional: He is oriented to person, place, and  time. He appears well-developed. No distress.   HENT:   Head: Normocephalic.   Right Ear: External ear normal.   Left Ear: External ear normal.   Mouth/Throat: Oropharynx is clear and moist.   Eyes: Conjunctivae and EOM are normal. Pupils are equal, round, and reactive to light.   Neck: Normal range of motion.   Cardiovascular: Normal rate, regular rhythm, normal heart sounds and intact distal pulses.   Pulmonary/Chest: Effort normal and breath sounds normal. He has no wheezes.   Abdominal: Soft. Bowel sounds are normal. There is no tenderness. No hernia.   Musculoskeletal: Normal range of motion. He exhibits no edema.   Neurological: He is alert and oriented to person, place, and time. No sensory deficit. He exhibits normal muscle tone. Coordination normal.   Skin: Skin is warm and dry.   Psychiatric: He has a normal mood and affect.   Vitals reviewed.    REVIEWED DATA    Labs:    Lab Results   Component Value Date     03/12/2019    K 4.8 03/12/2019    AST 19 03/12/2019    ALT 27 03/12/2019    BUN 19 03/12/2019    CREATININE 0.99 03/12/2019    EGFRIFNONA 75 03/12/2019    EGFRIFAFRI 91 03/12/2019       Lab Results   Component Value Date    HGBA1C 6.30 (H) 03/12/2019       Lab Results   Component Value Date    PSA 1.060 03/12/2019       Lab Results   Component Value Date    LDL 78 03/12/2019    HDL 74 (H) 03/12/2019    TRIG 49 03/12/2019    CHOLHDLRATIO 2.19 03/12/2019       No components found for: [id number]    Lab Results   Component Value Date    WBC 5.38 03/12/2019    HGB 13.3 03/12/2019    MCV 75.2 (L) 03/12/2019     03/12/2019       No results found for: PROTEIN, GLUCOSEU, BLOODU, NITRITEU, LEUKOCYTESUR       Lab Results   Component Value Date    HEPCVIRUSABY <0.1 03/12/2019       Imaging:     Medical Tests:   ______________________________________________________________________    ASSESSMENT & PLAN    ANNUAL WELLNESS EXAM / PHYSICAL     Other medical problems addressed today:  Recent labs reviewed  and explained with patient at today's visit.   Reviewed paperwork pt brought in for colonoscopy and hematology consults. Pt is scheduled for Hem visit 4/11/19 for possible thalassemia.     Summary/Discussion:     · Discussed risk of diabetes considering A1C 6.30. Reviewed foods to eat and aerobic exercise. Pt has been working to improve his diet and cut down on carbs for 1 month now. He says he walks 30 min to 1 hour most days of the week. PSA is normal at this time, will recheck annually, or if he develops urinary symptoms.   · Tdap given today.  · Follow up in 12 months for annual physical with fasting labs: PSA, CBC, CMP, A1C, lipid panel.       No Follow-up on file.    Future Appointments   Date Time Provider Department Center   4/9/2019  1:30 PM Ruthie Dsouza APRN [facility] None   4/11/2019  2:20 PM LAB CHAIR [facility]  [facility]   4/11/2019  3:00 PM Airc Valerio MD [facility]  [facility]         HEALTHCARE MAINTENANCE ISSUES:    Cancer Screening:  · Colon: Initial/Next screening at age: DUE NOW  · Repeat colon cancer screening: N/A at this time  · Prostate: checked PSA 1 month ago, normal . Will recheck annually.   · Testicular: Recommended monthly self exam  · Skin: Monthly self skin examination, annual exam by health professional  · Lung:   · Other:    Screening Labs & Tests:  · Lab results reviewed & discussed with with patient or orders placed today.  · EKG:  · Vascular Screening:   · DEXA (75+ or risk factors):   · HEP C (If born [birth year]115-6961 or risk factors): Not indicated    Immunization/Vaccinations (to be given today unless deferred by patient)  · Influenza: Patient had the flu shot this season  · Hepatitis A: Declined by patient  · Tetanus/Pertussis: Administer today  · Pneumovax: Declined by patient  · Prevnar 13: Declined by patient  · Shingles: Declined by patient, Recommend at pharmacy.  · Other:     Lifestyle Counseling:  · Lifestyle Modifications: Attempt to lose weight, Continue  good lifestyle choices/modifications, Improve dietary compliance, Maintain a low sugar/carbohydrate diet, Follow a low fat, low cholesterol diet, Make dinner the lightest meal of day, Continue to abstain/curtail drinking, Reduce exposure to stress, Improve sleep hygiene and Manage stress/anxiety better  · Safety Issues: Always wear seatbelt, Avoid texting while driving   · Use sunscreen, regular skin examination  · Recommended annual dental/vision examination.  · Emotional/Stress/Sleep: Reviewed and  given when appropriate      Health Maintenance   Topic Date Due   • ANNUAL PHYSICAL  04/07/1953   • TDAP/TD VACCINES (1 - Tdap) 04/07/1970   • ZOSTER VACCINE (1 of 2) 04/07/2001   • PNEUMOCOCCAL VACCINES (65+ LOW/MEDIUM RISK) (1 of 2 - PCV13) 04/07/2016   • COLONOSCOPY  02/25/2019   • INFLUENZA VACCINE  08/01/2019   • LIPID PANEL  03/12/2020   • HEPATITIS C SCREENING  Completed

## 2019-04-09 ENCOUNTER — OFFICE VISIT (OUTPATIENT)
Dept: INTERNAL MEDICINE | Age: 69
End: 2019-04-09

## 2019-04-09 VITALS
DIASTOLIC BLOOD PRESSURE: 60 MMHG | SYSTOLIC BLOOD PRESSURE: 110 MMHG | HEART RATE: 101 BPM | WEIGHT: 170 LBS | TEMPERATURE: 98.6 F | HEIGHT: 66 IN | RESPIRATION RATE: 16 BRPM | OXYGEN SATURATION: 98 % | BODY MASS INDEX: 27.32 KG/M2

## 2019-04-09 DIAGNOSIS — Z23 ENCOUNTER FOR IMMUNIZATION: ICD-10-CM

## 2019-04-09 DIAGNOSIS — R73.09 ELEVATED HEMOGLOBIN A1C: ICD-10-CM

## 2019-04-09 DIAGNOSIS — Z00.00 PREVENTATIVE HEALTH CARE: Primary | ICD-10-CM

## 2019-04-09 PROCEDURE — 90471 IMMUNIZATION ADMIN: CPT | Performed by: NURSE PRACTITIONER

## 2019-04-09 PROCEDURE — 99397 PER PM REEVAL EST PAT 65+ YR: CPT | Performed by: NURSE PRACTITIONER

## 2019-04-09 PROCEDURE — 90715 TDAP VACCINE 7 YRS/> IM: CPT | Performed by: NURSE PRACTITIONER

## 2019-04-11 ENCOUNTER — CONSULT (OUTPATIENT)
Dept: ONCOLOGY | Facility: CLINIC | Age: 69
End: 2019-04-11

## 2019-04-11 ENCOUNTER — LAB (OUTPATIENT)
Dept: LAB | Facility: HOSPITAL | Age: 69
End: 2019-04-11

## 2019-04-11 VITALS
RESPIRATION RATE: 16 BRPM | DIASTOLIC BLOOD PRESSURE: 81 MMHG | HEART RATE: 81 BPM | TEMPERATURE: 98.5 F | OXYGEN SATURATION: 97 % | SYSTOLIC BLOOD PRESSURE: 122 MMHG | BODY MASS INDEX: 26.76 KG/M2 | HEIGHT: 67 IN | WEIGHT: 170.5 LBS

## 2019-04-11 DIAGNOSIS — Z86.2 HX OF MICROCYTIC HYPOCHROMIC ANEMIA: Primary | ICD-10-CM

## 2019-04-11 DIAGNOSIS — R79.89 ABNORMAL COMPLETE BLOOD COUNT: Primary | ICD-10-CM

## 2019-04-11 LAB
BASOPHILS # BLD AUTO: 0.04 10*3/MM3 (ref 0–0.2)
BASOPHILS NFR BLD AUTO: 0.7 % (ref 0–1.5)
DEPRECATED RDW RBC AUTO: 38.5 FL (ref 37–54)
EOSINOPHIL # BLD AUTO: 0.1 10*3/MM3 (ref 0–0.4)
EOSINOPHIL NFR BLD AUTO: 1.7 % (ref 0.3–6.2)
ERYTHROCYTE [DISTWIDTH] IN BLOOD BY AUTOMATED COUNT: 15.1 % (ref 12.3–15.4)
FERRITIN SERPL-MCNC: 119 NG/ML (ref 30–400)
HCT VFR BLD AUTO: 42.7 % (ref 37.5–51)
HGB BLD-MCNC: 13.4 G/DL (ref 13–17.7)
HGB RETIC QN AUTO: 25.7 PG (ref 29.8–36.1)
IMM GRANULOCYTES # BLD AUTO: 0.05 10*3/MM3 (ref 0–0.05)
IMM GRANULOCYTES NFR BLD AUTO: 0.8 % (ref 0–0.5)
IMM RETICS NFR: 7.3 % (ref 3–15.8)
IRON 24H UR-MRATE: 69 MCG/DL (ref 59–158)
IRON SATN MFR SERPL: 21 % (ref 14–48)
LYMPHOCYTES # BLD AUTO: 1.91 10*3/MM3 (ref 0.7–3.1)
LYMPHOCYTES NFR BLD AUTO: 31.7 % (ref 19.6–45.3)
MCH RBC QN AUTO: 22.7 PG (ref 26.6–33)
MCHC RBC AUTO-ENTMCNC: 31.4 G/DL (ref 31.5–35.7)
MCV RBC AUTO: 72.3 FL (ref 79–97)
MONOCYTES # BLD AUTO: 0.61 10*3/MM3 (ref 0.1–0.9)
MONOCYTES NFR BLD AUTO: 10.1 % (ref 5–12)
NEUTROPHILS # BLD AUTO: 3.31 10*3/MM3 (ref 1.4–7)
NEUTROPHILS NFR BLD AUTO: 55 % (ref 42.7–76)
NRBC BLD AUTO-RTO: 0 /100 WBC (ref 0–0)
PLATELET # BLD AUTO: 180 10*3/MM3 (ref 140–450)
RBC # BLD AUTO: 5.91 10*6/MM3 (ref 4.14–5.8)
RETICS/RBC NFR AUTO: 0.82 % (ref 0.5–1.5)
TIBC SERPL-MCNC: 330 MCG/DL (ref 249–505)
TRANSFERRIN SERPL-MCNC: 236 MG/DL (ref 200–360)
WBC NRBC COR # BLD: 6.02 10*3/MM3 (ref 3.4–10.8)

## 2019-04-11 PROCEDURE — 36415 COLL VENOUS BLD VENIPUNCTURE: CPT | Performed by: INTERNAL MEDICINE

## 2019-04-11 PROCEDURE — 82728 ASSAY OF FERRITIN: CPT | Performed by: INTERNAL MEDICINE

## 2019-04-11 PROCEDURE — 83540 ASSAY OF IRON: CPT | Performed by: INTERNAL MEDICINE

## 2019-04-11 PROCEDURE — 84466 ASSAY OF TRANSFERRIN: CPT | Performed by: INTERNAL MEDICINE

## 2019-04-11 PROCEDURE — 85046 RETICYTE/HGB CONCENTRATE: CPT | Performed by: INTERNAL MEDICINE

## 2019-04-11 PROCEDURE — 99204 OFFICE O/P NEW MOD 45 MIN: CPT | Performed by: INTERNAL MEDICINE

## 2019-04-11 PROCEDURE — 85025 COMPLETE CBC W/AUTO DIFF WBC: CPT | Performed by: INTERNAL MEDICINE

## 2019-04-11 NOTE — PROGRESS NOTES
"  Subjective     REASON FOR CONSULTATION: Microcytosis without severe anemia.  Suspicion for thalassemia.  Provide an opinion on any further workup or treatment                             REQUESTING PHYSICIAN: Ruthie BILLY    RECORDS OBTAINED:  Records of the patients history including those obtained from the referring provider were reviewed and summarized in detail.    HISTORY OF PRESENT ILLNESS:  The patient is a 69 y.o. year old male who is here for an opinion about the above issue.  He is referred to us from his primary care office for evaluation of suspected thalassemia or hemoglobinopathy.  His blood count has usually shown a hemoglobin at the low end of normal with significantly low MCV.    He seems relatively healthy in the office today.  He does not have any palpable splenomegaly.  He reports that he had his gallbladder removed many years ago.  We will have him return to our lab to check the appropriate labs for hemoglobinopathy and thalassemia.    History of Present Illness     Past Medical History:   Diagnosis Date   • Anemia    • Diabetes (CMS/HCC)     prior doctor said \"watch for diabetes with diet\"   • Hyperlipidemia         Past Surgical History:   Procedure Laterality Date   • APPENDECTOMY  2004        Current Outpatient Medications on File Prior to Visit   Medication Sig Dispense Refill   • aspirin 81 MG EC tablet Take 81 mg by mouth Daily.     • atorvastatin (LIPITOR) 10 MG tablet Take 10 mg by mouth Daily. Pt is taking this every 2 days     • Cholecalciferol (VITAMIN D3 PO) Take  by mouth.       No current facility-administered medications on file prior to visit.         ALLERGIES:  No Known Allergies     Social History     Socioeconomic History   • Marital status:      Spouse name: Not on file   • Number of children: Not on file   • Years of education: Not on file   • Highest education level: Not on file   Occupational History     Employer: RETIRED   Tobacco Use   • Smoking status: " "Never Smoker   • Smokeless tobacco: Never Used   Substance and Sexual Activity   • Alcohol use: No     Frequency: Never   • Drug use: Defer   • Sexual activity: Yes        Family History   Problem Relation Age of Onset   • Diabetes Father    • Stroke Father    • Hypertension Father    • No Known Problems Mother    • No Known Problems Sister    • No Known Problems Brother    • No Known Problems Brother    • No Known Problems Brother    • No Known Problems Brother    • No Known Problems Sister    • No Known Problems Sister    • No Known Problems Sister    • Prostate cancer Neg Hx    • Colon cancer Neg Hx         Review of Systems   Constitutional: Negative for activity change, chills, fatigue and fever.   HENT: Negative for mouth sores, trouble swallowing and voice change.    Eyes: Negative for pain and visual disturbance.   Respiratory: Negative for cough, shortness of breath and wheezing.    Cardiovascular: Negative for chest pain and palpitations.   Gastrointestinal: Positive for constipation. Negative for abdominal pain, diarrhea, nausea and vomiting.   Genitourinary: Negative for difficulty urinating, frequency and urgency.   Musculoskeletal: Negative for arthralgias and joint swelling.   Skin: Negative for rash.   Neurological: Negative for dizziness, seizures, weakness and headaches.   Hematological: Negative for adenopathy. Does not bruise/bleed easily.   Psychiatric/Behavioral: Negative for behavioral problems and confusion. The patient is not nervous/anxious.         Objective     Vitals:    04/11/19 1534   BP: 122/81   Pulse: 81   Resp: 16   Temp: 98.5 °F (36.9 °C)   TempSrc: Oral   SpO2: 97%   Weight: 77.3 kg (170 lb 8 oz)   Height: 170 cm (66.93\")   PainSc: 0-No pain     Current Status 4/11/2019   ECOG score 0       Physical Exam   Constitutional: He is oriented to person, place, and time. He appears well-developed and well-nourished. No distress.   HENT:   Head: Normocephalic.   Eyes: Conjunctivae and " EOM are normal. Pupils are equal, round, and reactive to light. No scleral icterus.   Neck: Normal range of motion. Neck supple. No JVD present. No thyromegaly present.   Cardiovascular: Normal rate and regular rhythm. Exam reveals no gallop and no friction rub.   No murmur heard.  Pulmonary/Chest: Effort normal and breath sounds normal. He has no wheezes. He has no rales.   Abdominal: Soft. He exhibits no distension and no mass. There is no tenderness.   Musculoskeletal: Normal range of motion. He exhibits no edema or deformity.   Lymphadenopathy:     He has no cervical adenopathy.   Neurological: He is alert and oriented to person, place, and time. He has normal reflexes. No cranial nerve deficit.   Skin: Skin is warm and dry. No rash noted. No erythema.   Psychiatric: He has a normal mood and affect. His behavior is normal. Judgment normal.         RECENT LABS:  Hematology WBC   Date Value Ref Range Status   04/11/2019 6.02 3.40 - 10.80 10*3/mm3 Final   03/12/2019 5.38 3.40 - 10.80 10*3/mm3 Final     RBC   Date Value Ref Range Status   04/11/2019 5.91 (H) 4.14 - 5.80 10*6/mm3 Final   03/12/2019 5.93 (H) 4.14 - 5.80 10*6/mm3 Final     Hemoglobin   Date Value Ref Range Status   04/11/2019 13.4 13.0 - 17.7 g/dL Final     Hematocrit   Date Value Ref Range Status   04/11/2019 42.7 37.5 - 51.0 % Final     Platelets   Date Value Ref Range Status   04/11/2019 180 140 - 450 10*3/mm3 Final        Review of the peripheral smear  Red cells appear microcytic with some anisocytosis and poikilocytosis noted.  There is a rare target cell noted on the smear and occasional cigar shaped cells.  White cells and platelets appear normal in number and distribution.    Assessment/Plan   1.  Persistent microcytosis with changes on smear suggestive of underlying hemoglobinopathy or thalassemia.  Our suspicion is that this is a carrier state since he has not had any significant anemia.    Recommendations  1.  Patient will have additional  labs drawn from our lab today including hemoglobin electrophoresis profile, alpha thalassemia assay, reticulocyte count with reticulocyte hemoglobin, iron panel and ferritin.  2.  We will schedule patient for one return visit in 4 5 weeks to discuss the results of the above-noted labs and make any further recommendations from there.    Thanks for allowing us to see this nice gentleman in consultation.

## 2019-04-12 LAB
HGB A MFR BLD: 98.1 % (ref 96.4–98.8)
HGB A2 MFR BLD COLUMN CHROM: 1.9 % (ref 1.8–3.2)
HGB C MFR BLD: 0 %
HGB F MFR BLD: 0 % (ref 0–2)
HGB FRACT BLD-IMP: NORMAL
HGB S BLD QL SOLY: NEGATIVE
HGB S MFR BLD: 0 %

## 2019-04-19 LAB — HBA1 GENE MUT TESTED BLD/T: NORMAL

## 2019-04-26 ENCOUNTER — PREP FOR SURGERY (OUTPATIENT)
Dept: OTHER | Facility: HOSPITAL | Age: 69
End: 2019-04-26

## 2019-04-26 DIAGNOSIS — Z12.11 ENCOUNTER FOR SCREENING FOR MALIGNANT NEOPLASM OF COLON: Primary | ICD-10-CM

## 2019-04-26 RX ORDER — SODIUM CHLORIDE, SODIUM LACTATE, POTASSIUM CHLORIDE, CALCIUM CHLORIDE 600; 310; 30; 20 MG/100ML; MG/100ML; MG/100ML; MG/100ML
30 INJECTION, SOLUTION INTRAVENOUS CONTINUOUS
Status: CANCELLED | OUTPATIENT
Start: 2019-07-10

## 2019-04-29 ENCOUNTER — OFFICE VISIT (OUTPATIENT)
Dept: INTERNAL MEDICINE | Age: 69
End: 2019-04-29

## 2019-04-29 VITALS
BODY MASS INDEX: 26.3 KG/M2 | WEIGHT: 167.6 LBS | OXYGEN SATURATION: 98 % | SYSTOLIC BLOOD PRESSURE: 110 MMHG | DIASTOLIC BLOOD PRESSURE: 76 MMHG | HEART RATE: 99 BPM | HEIGHT: 67 IN | TEMPERATURE: 97.8 F

## 2019-04-29 DIAGNOSIS — R10.813 RIGHT LOWER QUADRANT ABDOMINAL TENDERNESS WITHOUT REBOUND TENDERNESS: ICD-10-CM

## 2019-04-29 DIAGNOSIS — R10.9 FLANK PAIN, ACUTE: Primary | ICD-10-CM

## 2019-04-29 DIAGNOSIS — R10.9 FLANK PAIN: ICD-10-CM

## 2019-04-29 PROBLEM — Z12.11 ENCOUNTER FOR SCREENING FOR MALIGNANT NEOPLASM OF COLON: Status: ACTIVE | Noted: 2019-04-29

## 2019-04-29 LAB
BASOPHILS # BLD AUTO: (no result) 10*3/UL
BASOPHILS # BLD MANUAL: 0.09 10*3/MM3 (ref 0–0.2)
BASOPHILS NFR BLD MANUAL: 1.9 % (ref 0–1.5)
BILIRUB BLD-MCNC: ABNORMAL MG/DL
BUN SERPL-MCNC: 10 MG/DL (ref 8–23)
BUN/CREAT SERPL: 10.4 (ref 7–25)
CALCIUM SERPL-MCNC: 9.7 MG/DL (ref 8.6–10.5)
CHLORIDE SERPL-SCNC: 104 MMOL/L (ref 98–107)
CLARITY, POC: ABNORMAL
CO2 SERPL-SCNC: 24.5 MMOL/L (ref 22–29)
COLOR UR: ABNORMAL
CREAT SERPL-MCNC: 0.96 MG/DL (ref 0.76–1.27)
DIFFERENTIAL COMMENT: ABNORMAL
EOSINOPHIL # BLD AUTO: (no result) 10*3/UL
EOSINOPHIL # BLD MANUAL: 0.23 10*3/MM3 (ref 0–0.4)
EOSINOPHIL NFR BLD AUTO: (no result) %
EOSINOPHIL NFR BLD MANUAL: 4.8 % (ref 0.3–6.2)
ERYTHROCYTE [DISTWIDTH] IN BLOOD BY AUTOMATED COUNT: 16.6 % (ref 12.3–15.4)
GLUCOSE SERPL-MCNC: 106 MG/DL (ref 65–99)
GLUCOSE UR STRIP-MCNC: NEGATIVE MG/DL
HCT VFR BLD AUTO: 43.6 % (ref 37.5–51)
HGB BLD-MCNC: 13.3 G/DL (ref 13–17.7)
KETONES UR QL: ABNORMAL
LEUKOCYTE EST, POC: NEGATIVE
LYMPHOCYTES # BLD AUTO: (no result) 10*3/UL
LYMPHOCYTES # BLD MANUAL: 1.74 10*3/MM3 (ref 0.7–3.1)
LYMPHOCYTES NFR BLD AUTO: (no result) %
LYMPHOCYTES NFR BLD MANUAL: 36.2 % (ref 19.6–45.3)
MCH RBC QN AUTO: 22.5 PG (ref 26.6–33)
MCHC RBC AUTO-ENTMCNC: 30.5 G/DL (ref 31.5–35.7)
MCV RBC AUTO: 73.9 FL (ref 79–97)
MONOCYTES # BLD MANUAL: 0.51 10*3/MM3 (ref 0.1–0.9)
MONOCYTES NFR BLD AUTO: (no result) %
MONOCYTES NFR BLD MANUAL: 10.5 % (ref 5–12)
NEUTROPHILS # BLD MANUAL: 2.25 10*3/MM3 (ref 1.7–7)
NEUTROPHILS NFR BLD AUTO: (no result) %
NEUTROPHILS NFR BLD MANUAL: 46.7 % (ref 42.7–76)
NITRITE UR-MCNC: NEGATIVE MG/ML
PH UR: 5 [PH] (ref 5–8)
PLATELET # BLD AUTO: 178 10*3/MM3 (ref 140–450)
PLATELET BLD QL SMEAR: ABNORMAL
POTASSIUM SERPL-SCNC: 4.7 MMOL/L (ref 3.5–5.2)
PROT UR STRIP-MCNC: ABNORMAL MG/DL
RBC # BLD AUTO: 5.9 10*6/MM3 (ref 4.14–5.8)
RBC # UR STRIP: NEGATIVE /UL
RBC MORPH BLD: ABNORMAL
SODIUM SERPL-SCNC: 140 MMOL/L (ref 136–145)
SP GR UR: 1.03 (ref 1–1.03)
UROBILINOGEN UR QL: NORMAL
WBC # BLD AUTO: 4.81 10*3/MM3 (ref 3.4–10.8)

## 2019-04-29 PROCEDURE — 99213 OFFICE O/P EST LOW 20 MIN: CPT | Performed by: NURSE PRACTITIONER

## 2019-04-29 PROCEDURE — 81003 URINALYSIS AUTO W/O SCOPE: CPT | Performed by: NURSE PRACTITIONER

## 2019-04-29 NOTE — PROGRESS NOTES
"Akira Reich / 69 y.o. / male  Encounter Date: 04/29/2019    ASSESSMENT & PLAN:    Problem List Items Addressed This Visit     None      Visit Diagnoses     Flank pain, acute    -  Primary    Relevant Orders    POC Urinalysis Dipstick, Automated (Completed)    Basic metabolic panel    CBC & Differential    Flank pain        Right lower quadrant abdominal tenderness without rebound tenderness        Relevant Orders    Basic metabolic panel    CBC & Differential        Orders Placed This Encounter   Procedures   • Basic metabolic panel   • POC Urinalysis Dipstick, Automated   • CBC & Differential     No orders of the defined types were placed in this encounter.      Summary/Discussion:  Check CMP and CBC for RLQ tenderness and pt reported urinary burning x 2 episodes in the past 4 days. Pending results, consider imaging or referral. Advised pt to continue to increase water intake and monitor for signs of worsening pain, fever, chills, n/v/d and go to ED if symptoms become severe. Pt verbalized understanding.     Return for Next scheduled follow up.  ________________________________________________________________    VITALS:    Visit Vitals  /76   Pulse 99   Temp 97.8 °F (36.6 °C) (Temporal)   Ht 170 cm (66.93\")   Wt 76 kg (167 lb 9.6 oz)   SpO2 98%   BMI 26.31 kg/m²       BP Readings from Last 3 Encounters:   04/29/19 110/76   04/11/19 122/81   04/09/19 110/60     Wt Readings from Last 3 Encounters:   04/29/19 76 kg (167 lb 9.6 oz)   04/11/19 77.3 kg (170 lb 8 oz)   04/09/19 77.1 kg (170 lb)      Body mass index is 26.31 kg/m².    CC: Main reason(s) for today's visit: Flank Pain    HPI    Patient is a 69 y.o. male who is here for acute R flank pain which started last week when he was on vacation in [place]. Duration 4 days. Rated 4-5/10 pain. Burning with urination 2x but no pain or itching. He has been increasing his water intake and burning has gone away but he still has R side flank pain which he rates as a 4/10 " "discomfort, constant, dull. PMH pertinent for appendectomy. No history of UTI or kidney issues in the past. No fevers, chills, n/v, urinary symptoms today.    Patient Care Team:  Ruthie Dsouza APRN as PCP - General (Nurse Practitioner)  Ruthie Dsouza APRN as Referring Physician (Nurse Practitioner)  Aric Valerio MD as Consulting Physician (Hematology and Oncology)  ____________________________________________________________________    REVIEW OF SYSTEMS    Review of Systems   Constitutional: Negative for activity change, appetite change, chills, diaphoresis, fever and unexpected weight change.   Respiratory: Negative.    Cardiovascular: Negative.    Gastrointestinal: Negative for abdominal pain, diarrhea, nausea and vomiting.   Genitourinary: Positive for flank pain (R, mild tenderness, NO CVA Pain with palpation) and frequency. Negative for dysuria, hematuria and scrotal swelling.        RLQ tenderness which pt reports as \"kidney pain\". Negative CVA tenderness R side.    Skin: Negative.      PHYSICAL EXAMINATION    Physical Exam   Constitutional: He is oriented to person, place, and time. He appears well-developed. No distress.   Pulmonary/Chest: Effort normal.   Abdominal: Soft. Bowel sounds are normal. He exhibits no distension and no mass. There is tenderness (RLQ with palpation). There is no rebound and no guarding. No hernia.   Musculoskeletal: Normal range of motion.   Neurological: He is alert and oriented to person, place, and time.   Vitals reviewed.    REVIEWED DATA:    Labs:   Lab Results   Component Value Date     03/12/2019    K 4.8 03/12/2019    AST 19 03/12/2019    ALT 27 03/12/2019    BUN 19 03/12/2019    CREATININE 0.99 03/12/2019    EGFRIFNONA 75 03/12/2019    EGFRIFAFRI 91 03/12/2019       Lab Results   Component Value Date    HGBA1C 6.30 (H) 03/12/2019       Lab Results   Component Value Date    LDL 78 03/12/2019    HDL 74 (H) 03/12/2019    TRIG 49 03/12/2019    CHOLHDLRATIO 2.19 " "03/12/2019       No results found for: TSH, FREET4       Lab Results   Component Value Date    WBC 6.02 04/11/2019    HGB 13.4 04/11/2019    HGB 13.3 03/12/2019     04/11/2019         Imaging:      Medical Tests:      Summary of old records / correspondence / consultant report:      Request outside records:   ______________________________________________________________________    ALLERGIES  No Known Allergies     MEDICATIONS  Current Outpatient Medications on File Prior to Visit   Medication Sig   • aspirin 81 MG EC tablet Take 81 mg by mouth Daily.   • atorvastatin (LIPITOR) 10 MG tablet Take 10 mg by mouth Daily. Pt is taking this every 2 days   • Cholecalciferol (VITAMIN D3 PO) Take  by mouth.   • NON FORMULARY agiolax   Every day 2 spoons.     No current facility-administered medications on file prior to visit.        PFSH:     The following portions of the patient's history were reviewed and updated as appropriate: Allergies / Current Medications / Past Medical History / Surgical History / Social History / Family History    PROBLEM LIST   Patient Active Problem List   Diagnosis   • Hx of microcytic hypochromic anemia       PAST MEDICAL HISTORY  Past Medical History:   Diagnosis Date   • Anemia    • Diabetes (CMS/HCC)     prior doctor said \"watch for diabetes with diet\"   • Hyperlipidemia        SURGICAL HISTORY  Past Surgical History:   Procedure Laterality Date   • APPENDECTOMY  2004       SOCIAL HISTORY  Social History     Socioeconomic History   • Marital status:      Spouse name: Not on file   • Number of children: Not on file   • Years of education: Not on file   • Highest education level: Not on file   Occupational History     Employer: RETIRED   Tobacco Use   • Smoking status: Never Smoker   • Smokeless tobacco: Never Used   Substance and Sexual Activity   • Alcohol use: No     Frequency: Never   • Drug use: Defer   • Sexual activity: Yes       FAMILY HISTORY  Family History   Problem " Relation Age of Onset   • Diabetes Father    • Stroke Father    • Hypertension Father    • No Known Problems Mother    • No Known Problems Sister    • No Known Problems Brother    • No Known Problems Brother    • No Known Problems Brother    • No Known Problems Brother    • No Known Problems Sister    • No Known Problems Sister    • No Known Problems Sister    • Anemia Other    • Prostate cancer Neg Hx    • Colon cancer Neg Hx

## 2019-04-30 DIAGNOSIS — R10.813 RIGHT LOWER QUADRANT ABDOMINAL TENDERNESS WITHOUT REBOUND TENDERNESS: Primary | ICD-10-CM

## 2019-05-01 ENCOUNTER — HOSPITAL ENCOUNTER (OUTPATIENT)
Dept: GENERAL RADIOLOGY | Facility: HOSPITAL | Age: 69
Discharge: HOME | End: 2019-05-01
Admitting: NURSE PRACTITIONER

## 2019-05-01 PROCEDURE — 74018 RADEX ABDOMEN 1 VIEW: CPT

## 2019-05-03 DIAGNOSIS — R10.813 RIGHT LOWER QUADRANT ABDOMINAL TENDERNESS WITHOUT REBOUND TENDERNESS: Primary | ICD-10-CM

## 2019-05-30 ENCOUNTER — APPOINTMENT (OUTPATIENT)
Dept: LAB | Facility: HOSPITAL | Age: 69
End: 2019-05-30

## 2019-05-30 ENCOUNTER — APPOINTMENT (OUTPATIENT)
Dept: ONCOLOGY | Facility: CLINIC | Age: 69
End: 2019-05-30

## 2019-07-03 ENCOUNTER — HOSPITAL ENCOUNTER (OUTPATIENT)
Dept: ULTRASOUND IMAGING | Facility: HOSPITAL | Age: 69
Discharge: HOME | End: 2019-07-03
Admitting: NURSE PRACTITIONER

## 2019-07-03 DIAGNOSIS — R10.813 RIGHT LOWER QUADRANT ABDOMINAL TENDERNESS WITHOUT REBOUND TENDERNESS: ICD-10-CM

## 2019-07-03 PROCEDURE — 76775 US EXAM ABDO BACK WALL LIM: CPT

## 2019-07-05 DIAGNOSIS — R10.813 RIGHT LOWER QUADRANT ABDOMINAL TENDERNESS WITHOUT REBOUND TENDERNESS: Primary | ICD-10-CM

## 2019-07-05 DIAGNOSIS — R30.9 URINARY PAIN: ICD-10-CM

## 2019-07-08 ENCOUNTER — APPOINTMENT (OUTPATIENT)
Dept: LAB | Facility: HOSPITAL | Age: 69
End: 2019-07-08

## 2019-07-08 ENCOUNTER — OFFICE VISIT (OUTPATIENT)
Dept: ONCOLOGY | Facility: CLINIC | Age: 69
End: 2019-07-08

## 2019-07-08 VITALS
TEMPERATURE: 98.1 F | HEIGHT: 67 IN | OXYGEN SATURATION: 99 % | BODY MASS INDEX: 25.36 KG/M2 | DIASTOLIC BLOOD PRESSURE: 83 MMHG | SYSTOLIC BLOOD PRESSURE: 116 MMHG | HEART RATE: 97 BPM | RESPIRATION RATE: 14 BRPM | WEIGHT: 161.6 LBS

## 2019-07-08 DIAGNOSIS — D56.3 ALPHA THALASSEMIA MINOR: Primary | ICD-10-CM

## 2019-07-08 PROCEDURE — G0463 HOSPITAL OUTPT CLINIC VISIT: HCPCS | Performed by: INTERNAL MEDICINE

## 2019-07-08 PROCEDURE — 99214 OFFICE O/P EST MOD 30 MIN: CPT | Performed by: INTERNAL MEDICINE

## 2019-07-08 NOTE — PROGRESS NOTES
"  Subjective     REASON FOR FOLLOW UP:   Alpha thalassemia minor                           HISTORY OF PRESENT ILLNESS:  The patient is a 69 y.o. year old male who was referred to us from his primary care office for evaluation of suspected thalassemia or hemoglobinopathy.  His blood count has usually shown a hemoglobin at the low end of normal with significantly low MCV.    He seemed relatively healthy in the office on his initial consult visit of 4/11/2019.  We ordered additional lab studies to evaluate for thalassemia at that time.  He returns today to discuss the results which did confirm the presence of alpha thalassemia minor.  History of Present Illness     Past Medical History:   Diagnosis Date   • Anemia    • Diabetes (CMS/HCC)     prior doctor said \"watch for diabetes with diet\"   • Hyperlipidemia         Past Surgical History:   Procedure Laterality Date   • APPENDECTOMY  2004        Current Outpatient Medications on File Prior to Visit   Medication Sig Dispense Refill   • aspirin 81 MG EC tablet Take 81 mg by mouth Daily.     • atorvastatin (LIPITOR) 10 MG tablet Take 10 mg by mouth Daily. Pt is taking this every 2 days     • Cholecalciferol (VITAMIN D3 PO) Take  by mouth.     • NON FORMULARY agiolax   Every day 2 spoons.       No current facility-administered medications on file prior to visit.         ALLERGIES:  No Known Allergies     Social History     Socioeconomic History   • Marital status:      Spouse name: Not on file   • Number of children: Not on file   • Years of education: Not on file   • Highest education level: Not on file   Occupational History     Employer: RETIRED   Tobacco Use   • Smoking status: Never Smoker   • Smokeless tobacco: Never Used   Substance and Sexual Activity   • Alcohol use: No     Frequency: Never   • Drug use: Defer   • Sexual activity: Yes        Family History   Problem Relation Age of Onset   • Diabetes Father    • Stroke Father    • Hypertension Father    • " "No Known Problems Mother    • No Known Problems Sister    • No Known Problems Brother    • No Known Problems Brother    • No Known Problems Brother    • No Known Problems Brother    • No Known Problems Sister    • No Known Problems Sister    • No Known Problems Sister    • Anemia Other    • Prostate cancer Neg Hx    • Colon cancer Neg Hx         Review of Systems   Constitutional: Negative for activity change, chills, fatigue and fever.   HENT: Negative for mouth sores, trouble swallowing and voice change.    Eyes: Negative for pain and visual disturbance.   Respiratory: Negative for cough, shortness of breath and wheezing.    Cardiovascular: Negative for chest pain and palpitations.   Gastrointestinal: Positive for constipation. Negative for abdominal pain, diarrhea, nausea and vomiting.   Genitourinary: Negative for difficulty urinating, frequency and urgency.   Musculoskeletal: Negative for arthralgias and joint swelling.   Skin: Negative for rash.   Neurological: Negative for dizziness, seizures, weakness and headaches.   Hematological: Negative for adenopathy. Does not bruise/bleed easily.   Psychiatric/Behavioral: Negative for behavioral problems and confusion. The patient is not nervous/anxious.         Objective     Vitals:    07/08/19 1430   BP: 116/83   Pulse: 97   Resp: 14   Temp: 98.1 °F (36.7 °C)   SpO2: 99%   Weight: 73.3 kg (161 lb 9.6 oz)   Height: 170 cm (66.93\")   PainSc: 0-No pain     Current Status 7/8/2019   ECOG score 0       Physical Exam   Constitutional: He is oriented to person, place, and time. He appears well-developed and well-nourished. No distress.   HENT:   Head: Normocephalic.   Eyes: Conjunctivae and EOM are normal. Pupils are equal, round, and reactive to light. No scleral icterus.   Neck: Normal range of motion. Neck supple. No JVD present. No thyromegaly present.   Cardiovascular: Normal rate and regular rhythm. Exam reveals no gallop and no friction rub.   No murmur " heard.  Pulmonary/Chest: Effort normal and breath sounds normal. He has no wheezes. He has no rales.   Abdominal: Soft. He exhibits no distension and no mass. There is no tenderness.   Musculoskeletal: Normal range of motion. He exhibits no edema or deformity.   Lymphadenopathy:     He has no cervical adenopathy.   Neurological: He is alert and oriented to person, place, and time. He has normal reflexes. No cranial nerve deficit.   Skin: Skin is warm and dry. No rash noted. No erythema.   Psychiatric: He has a normal mood and affect. His behavior is normal. Judgment normal.         RECENT LABS:  Hematology WBC   Date Value Ref Range Status   04/29/2019 4.81 3.40 - 10.80 10*3/mm3 Final     RBC   Date Value Ref Range Status   04/29/2019 5.90 (H) 4.14 - 5.80 10*6/mm3 Final     Hemoglobin   Date Value Ref Range Status   04/29/2019 13.3 13.0 - 17.7 g/dL Final   04/11/2019 13.4 13.0 - 17.7 g/dL Final     Hematocrit   Date Value Ref Range Status   04/29/2019 43.6 37.5 - 51.0 % Final   04/11/2019 42.7 37.5 - 51.0 % Final     Platelets   Date Value Ref Range Status   04/29/2019 178 140 - 450 10*3/mm3 Final   04/11/2019 180 140 - 450 10*3/mm3 Final          4/11/2019  Alpha-Thalassemia Comment:    Comment: Test: Alpha-Thalassemia, DNA Analysis   Result:        Alpha-thalassemia minor, alpha-/alpha-   Mutation(s) identified:  alpha3.7 and alpha3.7   Interpretation:   This result is most consistent with this individual being an   unaffected carrier of alpha-thalassemia with two of the four   alpha-globin genes deleted (Alpha-thalassemia minor).      Hgb Solubility Negative Negative    Hgb F Quant 0.0 - 2.0 % 0.0    Hgb A 96.4 - 98.8 % 98.1    Hgb S 0.0 % 0.0    Hgb C 0.0 % 0.0    Hgb A2 Quant 1.8 - 3.2 % 1.9    Hgb Interp.  Comment    Comment: Normal adult hemoglobin present     Review of the peripheral smear  Red cells appear microcytic with some anisocytosis and poikilocytosis noted.  There is a rare target cell noted on the  smear and occasional cigar shaped cells.  White cells and platelets appear normal in number and distribution.    Assessment/Plan   1.  Alpha thalassemia minor.  This would explain the patient's chronic microcytosis but normal hemoglobin.    Recommendations  1.  We shared the results of the thalassemia assay with the patient.  We explained that this is not likely to cause him any significant problems.  This is an autosomal recessive genetic defect which could potentially have been passed on to his offspring and further genetic testing of the family members would be reasonable particularly if they have any significant degree of anemia.  2.  We do not plan routine follow-up in our office but certainly would be happy to see this gentleman again at any point in the future if new concerns arise.

## 2019-07-10 ENCOUNTER — HOSPITAL ENCOUNTER (OUTPATIENT)
Facility: HOSPITAL | Age: 69
Setting detail: HOSPITAL OUTPATIENT SURGERY
Discharge: HOME | End: 2019-07-10
Attending: INTERNAL MEDICINE | Admitting: INTERNAL MEDICINE

## 2019-07-10 ENCOUNTER — ANESTHESIA (OUTPATIENT)
Dept: GASTROENTEROLOGY | Facility: HOSPITAL | Age: 69
End: 2019-07-10

## 2019-07-10 ENCOUNTER — ANESTHESIA EVENT (OUTPATIENT)
Dept: GASTROENTEROLOGY | Facility: HOSPITAL | Age: 69
End: 2019-07-10

## 2019-07-10 VITALS
HEART RATE: 65 BPM | SYSTOLIC BLOOD PRESSURE: 113 MMHG | WEIGHT: 160.3 LBS | TEMPERATURE: 97.4 F | DIASTOLIC BLOOD PRESSURE: 83 MMHG | OXYGEN SATURATION: 96 % | RESPIRATION RATE: 16 BRPM | BODY MASS INDEX: 26.71 KG/M2 | HEIGHT: 65 IN

## 2019-07-10 DIAGNOSIS — Z12.11 ENCOUNTER FOR SCREENING FOR MALIGNANT NEOPLASM OF COLON: ICD-10-CM

## 2019-07-10 PROCEDURE — 88305 TISSUE EXAM BY PATHOLOGIST: CPT | Performed by: INTERNAL MEDICINE

## 2019-07-10 PROCEDURE — 45381 COLONOSCOPY SUBMUCOUS NJX: CPT | Performed by: INTERNAL MEDICINE

## 2019-07-10 PROCEDURE — 45385 COLONOSCOPY W/LESION REMOVAL: CPT | Performed by: INTERNAL MEDICINE

## 2019-07-10 PROCEDURE — 25010000002 PROPOFOL 10 MG/ML EMULSION: Performed by: ANESTHESIOLOGY

## 2019-07-10 DEVICE — DEV CLIP ENDO RESOLUTION360 CONTRL ROT 235CM: Type: IMPLANTABLE DEVICE | Site: SIGMOID COLON | Status: FUNCTIONAL

## 2019-07-10 RX ORDER — PROPOFOL 10 MG/ML
VIAL (ML) INTRAVENOUS AS NEEDED
Status: DISCONTINUED | OUTPATIENT
Start: 2019-07-10 | End: 2019-07-10 | Stop reason: SURG

## 2019-07-10 RX ORDER — SODIUM CHLORIDE, SODIUM LACTATE, POTASSIUM CHLORIDE, CALCIUM CHLORIDE 600; 310; 30; 20 MG/100ML; MG/100ML; MG/100ML; MG/100ML
30 INJECTION, SOLUTION INTRAVENOUS CONTINUOUS
Status: DISCONTINUED | OUTPATIENT
Start: 2019-07-10 | End: 2019-07-10 | Stop reason: HOSPADM

## 2019-07-10 RX ORDER — PROMETHAZINE HYDROCHLORIDE 25 MG/1
25 TABLET ORAL ONCE AS NEEDED
Status: DISCONTINUED | OUTPATIENT
Start: 2019-07-10 | End: 2019-07-10 | Stop reason: HOSPADM

## 2019-07-10 RX ORDER — PROMETHAZINE HYDROCHLORIDE 25 MG/1
25 SUPPOSITORY RECTAL ONCE AS NEEDED
Status: DISCONTINUED | OUTPATIENT
Start: 2019-07-10 | End: 2019-07-10 | Stop reason: HOSPADM

## 2019-07-10 RX ORDER — LIDOCAINE HYDROCHLORIDE 20 MG/ML
INJECTION, SOLUTION INFILTRATION; PERINEURAL AS NEEDED
Status: DISCONTINUED | OUTPATIENT
Start: 2019-07-10 | End: 2019-07-10 | Stop reason: SURG

## 2019-07-10 RX ORDER — SODIUM CHLORIDE 0.9 % (FLUSH) 0.9 %
3 SYRINGE (ML) INJECTION AS NEEDED
Status: DISCONTINUED | OUTPATIENT
Start: 2019-07-10 | End: 2019-07-10 | Stop reason: HOSPADM

## 2019-07-10 RX ORDER — LIDOCAINE HYDROCHLORIDE 10 MG/ML
0.5 INJECTION, SOLUTION INFILTRATION; PERINEURAL ONCE AS NEEDED
Status: DISCONTINUED | OUTPATIENT
Start: 2019-07-10 | End: 2019-07-10 | Stop reason: HOSPADM

## 2019-07-10 RX ORDER — PROMETHAZINE HYDROCHLORIDE 25 MG/ML
12.5 INJECTION, SOLUTION INTRAMUSCULAR; INTRAVENOUS ONCE AS NEEDED
Status: DISCONTINUED | OUTPATIENT
Start: 2019-07-10 | End: 2019-07-10 | Stop reason: HOSPADM

## 2019-07-10 RX ORDER — PROPOFOL 10 MG/ML
VIAL (ML) INTRAVENOUS CONTINUOUS PRN
Status: DISCONTINUED | OUTPATIENT
Start: 2019-07-10 | End: 2019-07-10 | Stop reason: SURG

## 2019-07-10 RX ADMIN — LIDOCAINE HYDROCHLORIDE 50 MG: 20 INJECTION, SOLUTION INFILTRATION; PERINEURAL at 13:18

## 2019-07-10 RX ADMIN — PROPOFOL 60 MG: 10 INJECTION, EMULSION INTRAVENOUS at 13:18

## 2019-07-10 RX ADMIN — PROPOFOL 180 MCG/KG/MIN: 10 INJECTION, EMULSION INTRAVENOUS at 13:18

## 2019-07-10 RX ADMIN — SODIUM CHLORIDE, POTASSIUM CHLORIDE, SODIUM LACTATE AND CALCIUM CHLORIDE 30 ML/HR: 600; 310; 30; 20 INJECTION, SOLUTION INTRAVENOUS at 13:09

## 2019-07-10 NOTE — ANESTHESIA POSTPROCEDURE EVALUATION
"Patient: Akira Reich    Procedure Summary     Date:  07/10/19 Room / Location:  [facility] ENDOSCOPY 10 / [facility] ENDOSCOPY    Anesthesia Start:  1317 Anesthesia Stop:  1346    Procedure:  COLONOSCOPY to cecum and t.i. with saline lift and hot snare polypectomy wiht clip placement x1 (N/A ) Diagnosis:       Encounter for screening for malignant neoplasm of colon      (Encounter for screening for malignant neoplasm of colon [Z12.11])    Surgeon:  Miguel Kuhn MD Provider:  Lucy Holland MD    Anesthesia Type:  MAC ASA Status:  2          Anesthesia Type: MAC  Last vitals  BP   98/65 (07/10/19 1345)   Temp   36.3 °C (97.4 °F) (07/10/19 1256)   Pulse   73 (07/10/19 1345)   Resp   16 (07/10/19 1345)     SpO2   98 % (07/10/19 1345)     Post Anesthesia Care and Evaluation    Patient location during evaluation: PHASE II  Patient participation: complete - patient participated  Level of consciousness: awake  Pain management: adequate  Airway patency: patent  Anesthetic complications: No anesthetic complications    Cardiovascular status: acceptable  Respiratory status: acceptable  Hydration status: acceptable    Comments: BP 98/65 (BP Location: Left arm, Patient Position: Lying)   Pulse 73   Temp 36.3 °C (97.4 °F) (Oral)   Resp 16   Ht 165.1 cm (65\")   Wt 72.7 kg (160 lb 4.8 oz)   SpO2 98%   BMI 26.68 kg/m²       "

## 2019-07-10 NOTE — H&P
"[facility]  Pre Procedure History & Physical    Chief Complaint:   Time for my colonoscopy    Subjective     HPI:   69 y.o. male presenting for average risk screening.  No prior cscope.  No current GI issues.  No family hx of colon cancer or polyps.        Past Medical History:   Past Medical History:   Diagnosis Date   • Anemia    • Diabetes (CMS/HCC)     prior doctor said \"watch for diabetes with diet\"   • Hyperlipidemia        Family History:  Family History   Problem Relation Age of Onset   • Diabetes Father    • Stroke Father    • Hypertension Father    • No Known Problems Mother    • No Known Problems Sister    • No Known Problems Brother    • No Known Problems Brother    • No Known Problems Brother    • No Known Problems Brother    • No Known Problems Sister    • No Known Problems Sister    • No Known Problems Sister    • Anemia Other    • Prostate cancer Neg Hx    • Colon cancer Neg Hx        Social History:   reports that he has never smoked. He has never used smokeless tobacco. Drug use questions deferred to the physician. He reports that he does not drink alcohol.    Medications:   Medications Prior to Admission   Medication Sig Dispense Refill Last Dose   • aspirin 81 MG EC tablet Take 81 mg by mouth Daily.   7/9/2019 at Unknown time   • atorvastatin (LIPITOR) 10 MG tablet Take 10 mg by mouth Daily. Pt is taking this every 2 days   7/9/2019 at Unknown time   • Cholecalciferol (VITAMIN D3 PO) Take  by mouth.   7/9/2019 at Unknown time   • NON FORMULARY agiolax   Every day 2 spoons.   Unknown at Unknown time       Allergies:  Patient has no known allergies.    ROS:    Pertinent items are noted in HPI     Objective     Blood pressure 107/68, pulse 72, temperature 97.4 °F (36.3 °C), temperature source Oral, resp. rate 14, height 165.1 cm (65\"), weight 72.7 kg (160 lb 4.8 oz), SpO2 98 %.    Physical Exam   Constitutional: Pt is oriented to person, place, and time and well-developed, " well-nourished, and in no distress.   HENT:   Mouth/Throat: Oropharynx is clear and moist.   Neck: Normal range of motion. Neck supple.   Cardiovascular: Normal rate, regular rhythm and normal heart sounds.    Pulmonary/Chest: Effort normal and breath sounds normal. No respiratory distress. No  wheezes.   Abdominal: Soft. Bowel sounds are normal.   Skin: Skin is warm and dry.   Psychiatric: Mood, memory, affect and judgment normal.     Assessment/Plan     Diagnosis:  Encounter for screening for colon cancer    Anticipated Surgical Procedure:  Colonoscopy    The risks, benefits, and alternatives of this procedure have been discussed with the patient or the responsible party- the patient understands and agrees to proceed.

## 2019-07-11 LAB
CYTO UR: NORMAL
LAB AP CASE REPORT: NORMAL
PATH REPORT.FINAL DX SPEC: NORMAL
PATH REPORT.GROSS SPEC: NORMAL

## 2019-07-29 ENCOUNTER — OFFICE VISIT (OUTPATIENT)
Dept: INTERNAL MEDICINE | Age: 69
End: 2019-07-29

## 2019-07-29 VITALS
HEART RATE: 90 BPM | HEIGHT: 65 IN | WEIGHT: 161 LBS | TEMPERATURE: 98.4 F | SYSTOLIC BLOOD PRESSURE: 118 MMHG | BODY MASS INDEX: 26.82 KG/M2 | OXYGEN SATURATION: 98 % | DIASTOLIC BLOOD PRESSURE: 70 MMHG

## 2019-07-29 DIAGNOSIS — R73.09 ELEVATED HEMOGLOBIN A1C MEASUREMENT: ICD-10-CM

## 2019-07-29 DIAGNOSIS — R21 RASH OF FACE: ICD-10-CM

## 2019-07-29 DIAGNOSIS — B35.4 TINEA CORPORIS: Primary | ICD-10-CM

## 2019-07-29 PROCEDURE — 99213 OFFICE O/P EST LOW 20 MIN: CPT | Performed by: NURSE PRACTITIONER

## 2019-07-29 RX ORDER — CLOTRIMAZOLE 1 %
CREAM (GRAM) TOPICAL 2 TIMES DAILY
Qty: 60 G | Refills: 2 | Status: SHIPPED | OUTPATIENT
Start: 2019-07-29

## 2019-07-29 NOTE — PROGRESS NOTES
"Akira Reich / 69 y.o. / male  Encounter Date: 07/29/2019    ASSESSMENT & PLAN:    Problem List Items Addressed This Visit     None      Visit Diagnoses     Tinea corporis    -  Primary    Relevant Medications    clotrimazole (LOTRIMIN) 1 % cream    Rash of face        Relevant Medications    clotrimazole (LOTRIMIN) 1 % cream    Elevated hemoglobin A1c measurement        Relevant Orders    Hemoglobin A1c        Orders Placed This Encounter   Procedures   • Hemoglobin A1c     New Medications Ordered This Visit   Medications   • clotrimazole (LOTRIMIN) 1 % cream     Sig: Apply  topically to the appropriate area as directed 2 (Two) Times a Day.     Dispense:  60 g     Refill:  2       Summary/Discussion:  1.  Instructed patient to apply Lotrimin cream to affected area 2 times daily, for 4 weeks.  Advised patient to return to clinic if symptom relief is not reached in 4 weeks.  2.  Recheck hemoglobin A1c today for previous elevation in May.  Patient did not start medication at that time, he deferred and decided to do lifestyle modification instead.  3. Advised patient to continue lifestyle modifications for elevated A1c, continue to monitor home blood sugar readings. Continue healthy diet and lifestyle, along with current medications as prescribed.     Return in about 3 months (around 10/29/2019), or if symptoms worsen or fail to improve, for Next scheduled follow up.  ________________________________________________________________    VITALS:    Visit Vitals  /70   Pulse 90   Temp 98.4 °F (36.9 °C) (Temporal)   Ht 165.1 cm (65\")   Wt 73 kg (161 lb)   SpO2 98%   BMI 26.79 kg/m²       BP Readings from Last 3 Encounters:   07/29/19 118/70   07/10/19 113/83   07/08/19 116/83     Wt Readings from Last 3 Encounters:   07/29/19 73 kg (161 lb)   07/10/19 72.7 kg (160 lb 4.8 oz)   07/08/19 73.3 kg (161 lb 9.6 oz)      Body mass index is 26.79 kg/m².    CC: Main reason(s) for today's visit: Rash      HPI    Patient is a " 69 y.o. male who is here for face rash x 2 weeks.   Features of cutaneous lesions: pruritic, circular, mildly erythematous, scaling patch   Location: L forehead, along hairline  Symptoms: severe pruritis  Duration of the eruption: 2 weeks  Exacerbating factors: heat, scratching  He has never experienced a skin rash of this kind in the past. He has not come into contact with any new/known irritant substances recently. He has not used any chemicals, soaps, or lotions on the lesion.     Patient Care Team:  Ruthie Dsouza APRN as PCP - General (Nurse Practitioner)  Ruthie Dsouza APRN as Referring Physician (Nurse Practitioner)  Aric Valerio MD as Consulting Physician (Hematology and Oncology)  ____________________________________________________________________    REVIEW OF SYSTEMS    Review of Systems   Constitutional: Negative for chills, diaphoresis and fever.   HENT: Negative.    Eyes: Negative.    Respiratory: Negative.    Cardiovascular: Negative.    Skin: Positive for rash (L face).   Neurological: Negative.    Psychiatric/Behavioral: Negative.        PHYSICAL EXAMINATION    Physical Exam   Constitutional: He appears well-developed. No distress.   HENT:   Head: Normocephalic.       Mouth/Throat: Oropharynx is clear and moist.   Circular, mildly erythematous, pruritic plaque   Eyes: Conjunctivae and EOM are normal. Pupils are equal, round, and reactive to light.   Neck: Normal range of motion. Neck supple.   Pulmonary/Chest: Effort normal.   Skin: Skin is warm and dry. Rash noted.   Psychiatric: He has a normal mood and affect.   Vitals reviewed.    REVIEWED DATA:    Labs:   Lab Results   Component Value Date     04/29/2019    K 4.7 04/29/2019    AST 19 03/12/2019    ALT 27 03/12/2019    BUN 10 04/29/2019    CREATININE 0.96 04/29/2019    CREATININE 0.99 03/12/2019    EGFRIFNONA 78 04/29/2019    EGFRIFAFRI 94 04/29/2019       Lab Results   Component Value Date    HGBA1C 6.30 (H) 03/12/2019       Lab  "Results   Component Value Date    LDL 78 03/12/2019    HDL 74 (H) 03/12/2019    TRIG 49 03/12/2019    CHOLHDLRATIO 2.19 03/12/2019       No results found for: TSH, FREET4       Lab Results   Component Value Date    WBC 4.81 04/29/2019    HGB 13.3 04/29/2019    HGB 13.4 04/11/2019    HGB 13.3 03/12/2019     04/29/2019         Imaging:      Medical Tests:      Summary of old records / correspondence / consultant report:      Request outside records:   ______________________________________________________________________    ALLERGIES  No Known Allergies     MEDICATIONS  Current Outpatient Medications on File Prior to Visit   Medication Sig   • aspirin 81 MG EC tablet Take 81 mg by mouth Daily.   • atorvastatin (LIPITOR) 10 MG tablet Take 10 mg by mouth Daily. Pt is taking this every 2 days   • Cholecalciferol (VITAMIN D3 PO) Take  by mouth.   • NON FORMULARY agiolax   Every day 2 spoons.     No current facility-administered medications on file prior to visit.        PFSH:     The following portions of the patient's history were reviewed and updated as appropriate: Allergies / Current Medications / Past Medical History / Surgical History / Social History / Family History    PROBLEM LIST   Patient Active Problem List   Diagnosis   • Hx of microcytic hypochromic anemia   • Encounter for screening for malignant neoplasm of colon   • Alpha thalassemia minor       PAST MEDICAL HISTORY  Past Medical History:   Diagnosis Date   • Anemia    • Diabetes (CMS/HCC)     prior doctor said \"watch for diabetes with diet\"   • Hyperlipidemia        SURGICAL HISTORY  Past Surgical History:   Procedure Laterality Date   • APPENDECTOMY  2004   • CHOLECYSTECTOMY     • COLONOSCOPY N/A 7/10/2019    Procedure: COLONOSCOPY to cecum and t.i. with saline lift and hot snare polypectomy wiht clip placement x1;  Surgeon: Miguel Kuhn MD;  Location: [facility] ENDOSCOPY;  Service: Gastroenterology       SOCIAL HISTORY  Social History "     Socioeconomic History   • Marital status:      Spouse name: Not on file   • Number of children: Not on file   • Years of education: Not on file   • Highest education level: Not on file   Occupational History     Employer: RETIRED   Tobacco Use   • Smoking status: Never Smoker   • Smokeless tobacco: Never Used   Substance and Sexual Activity   • Alcohol use: No     Frequency: Never   • Drug use: Defer   • Sexual activity: Yes       FAMILY HISTORY  Family History   Problem Relation Age of Onset   • Diabetes Father    • Stroke Father    • Hypertension Father    • No Known Problems Mother    • No Known Problems Sister    • No Known Problems Brother    • No Known Problems Brother    • No Known Problems Brother    • No Known Problems Brother    • No Known Problems Sister    • No Known Problems Sister    • No Known Problems Sister    • Anemia Other    • Prostate cancer Neg Hx    • Colon cancer Neg Hx

## 2019-07-29 NOTE — PATIENT INSTRUCTIONS
Body Ringworm  Body ringworm is an infection of the skin that often causes a ring-shaped rash. Body ringworm can affect any part of your skin. It can spread easily to others. Body ringworm is also called tinea corporis.  What are the causes?  This condition is caused by funguses called dermatophytes. The condition develops when these funguses grow out of control on the skin.  You can get this condition if you touch a person or animal that has it. You can also get it if you share clothing, bedding, towels, or any other object with an infected person or pet.  What increases the risk?  This condition is more likely to develop in:  · Athletes who often make skin-to-skin contact with other athletes, such as wrestlers.  · People who share equipment and mats.  · People with a weakened immune system.    What are the signs or symptoms?  Symptoms of this condition include:  · Itchy, raised red spots and bumps.  · Red scaly patches.  · A ring-shaped rash. The rash may have:  ? A clear center.  ? Scales or red bumps at its center.  ? Redness near its borders.  ? Dry and scaly skin on or around it.    How is this diagnosed?  This condition can usually be diagnosed with a skin exam. A skin scraping may be taken from the affected area and examined under a microscope to see if the fungus is present.  How is this treated?  This condition may be treated with:  · An antifungal cream or ointment.  · An antifungal shampoo.  · Antifungal medicines. These may be prescribed if your ringworm is severe, keeps coming back, or lasts a long time.    Follow these instructions at home:  · Take over-the-counter and prescription medicines only as told by your health care provider.  · If you were given an antifungal cream or ointment:  ? Use it as told by your health care provider.  ? Wash the infected area and dry it completely before applying the cream or ointment.  · If you were given an antifungal shampoo:  ? Use it as told by your health care  provider.  ? Leave the shampoo on your body for 3-5 minutes before rinsing.  · While you have a rash:  ? Wear loose clothing to stop clothes from rubbing and irritating it.  ? Wash or change your bed sheets every night.  · If your pet has the same infection, take your pet to see a .  How is this prevented?  · Practice good hygiene.  · Wear sandals or shoes in public places and showers.  · Do not share personal items with others.  · Avoid touching red patches of skin on other people.  · Avoid touching pets that have bald spots.  · If you touch an animal that has a bald spot, wash your hands.  Contact a health care provider if:  · Your rash continues to spread after 7 days of treatment.  · Your rash is not gone in 4 weeks.  · The area around your rash gets red, warm, tender, and swollen.  This information is not intended to replace advice given to you by your health care provider. Make sure you discuss any questions you have with your health care provider.  Document Released: 12/15/2001 Document Revised: 05/25/2017 Document Reviewed: 10/13/2016  [employer] Patient Education © 2019 [employer]

## 2019-07-30 LAB — HBA1C MFR BLD: 6.1 % (ref 4.8–5.6)

## 2019-07-31 ENCOUNTER — TELEPHONE (OUTPATIENT)
Dept: GASTROENTEROLOGY | Facility: CLINIC | Age: 69
End: 2019-07-31

## 2019-07-31 DIAGNOSIS — R73.03 PREDIABETES: ICD-10-CM

## 2019-07-31 DIAGNOSIS — R73.09 ELEVATED HEMOGLOBIN A1C MEASUREMENT: Primary | ICD-10-CM

## 2019-07-31 NOTE — TELEPHONE ENCOUNTER
----- Message from Miguel Kuhn MD sent at 7/25/2019  2:09 PM EDT -----  Adenoma, benign.  Recall 3 years

## 2019-07-31 NOTE — TELEPHONE ENCOUNTER
Patient called, advised as per Dr. Kuhn's note. He verb understanding. Repeat c/s in 7/10/2022, placed in patient's health maintenance record.

## 2019-08-22 NOTE — PROGRESS NOTES
"Akira Reich / 69 y.o. / male  Encounter Date: 08/23/2019    ASSESSMENT & PLAN:    Problem List Items Addressed This Visit     None      Visit Diagnoses     Tinea corporis    -  Primary    Relevant Medications    terbinafine (lamiSIL) 250 MG tablet    Disturbance in sleep behavior        Relevant Medications    hydrOXYzine (ATARAX) 25 MG tablet        No orders of the defined types were placed in this encounter.    New Medications Ordered This Visit   Medications   • terbinafine (lamiSIL) 250 MG tablet     Sig: Take 1 tablet by mouth Daily.     Dispense:  14 tablet     Refill:  0   • hydrOXYzine (ATARAX) 25 MG tablet     Sig: Take 1 tablet by mouth 3 (Three) Times a Day As Needed for Itching. Take 1-2 tablets as needed for anxiety/sleep     Dispense:  20 tablet     Refill:  0       Summary/Discussion:  1. Instructed patient to continue clotrimazole cream on lesion in combination with 2 weeks of terbinafine orally for tinea infection. Advised him that it can take this long for fungal infections to heal and patience is required. LFT's are normal and expected to tolerate short course of therapy without issue.   2. Use sleep aids as described. I have recommended patient work on stress management and sleep habits and provided instructions to him for this. He has been told to use Benadryl OTC for sleep as needed and only if that does not help at all, he can try prescription hydroxyzine. Most importantly, however, I told him he needs to work on stress relief and sleep habits for improvement and he cannot rely on sleep aids.     Return in about 3 months (around 11/23/2019).  ________________________________________________________________    VITALS:    Visit Vitals  /80   Pulse 89   Temp 97.9 °F (36.6 °C) (Temporal)   Ht 165.1 cm (65\")   Wt 75.3 kg (166 lb)   SpO2 98%   BMI 27.62 kg/m²       BP Readings from Last 3 Encounters:   08/23/19 120/80   07/29/19 118/70   07/10/19 113/83     Wt Readings from Last 3 " Encounters:   08/23/19 75.3 kg (166 lb)   07/29/19 73 kg (161 lb)   07/10/19 72.7 kg (160 lb 4.8 oz)      Body mass index is 27.62 kg/m².    CC: Main reason(s) for today's visit: Rash and Sleeping Problem      HPI    Patient is a 69 y.o. male who is here for follow up on rash of face. He was previously diagnosed with tinea corporis and advised that treatment can take a while to clear up skin. He began treatment with clotrimazole cream 3 weeks ago. He reports the lesion has improved some, but is still present somewhat.   Features of cutaneous lesions: pruritic, circular, no erythema, scaling patch   Location: L forehead, along hairline  Symptoms: persistent pruritis  Duration of the eruption: 5 weeks, has been using medication for 3 weeks  Exacerbating factors: heat, scratching  He has never experienced a skin rash of this kind in the past. He has not come into contact with any new/known irritant substances recently. He has not used any chemicals, soaps, or lotions on the lesion.     Sleep disturbance: He reports that he has been having difficulty with initiating and maintaining sleep over the past 1 week. He is experiencing some familial stress at this time and has a new grandchild in the home. His family is also planning to move back to [place] in the next few weeks to months and this is causing him stress. He has not tried anything for sleep aids at this time and he has no history of sleep disturbance.     Patient Care Team:  Ruthie Dsouza APRN as PCP - General (Nurse Practitioner)  Ruthie Dsouza APRN as Referring Physician (Nurse Practitioner)  Aric Valerio MD as Consulting Physician (Hematology and Oncology)  ____________________________________________________________________    REVIEW OF SYSTEMS  PER HPI NOTE  Review of Systems   Constitutional: Negative for chills, diaphoresis and fever.   HENT: Negative.    Eyes: Negative.    Respiratory: Negative.    Cardiovascular: Negative.    Skin: Positive  for rash (L face).   Neurological: Negative.    Psychiatric/Behavioral: Positive for sleep disturbance.       PHYSICAL EXAMINATION    Physical Exam   Constitutional: He is oriented to person, place, and time. He appears well-developed and well-nourished. No distress.   HENT:   Head: Normocephalic.       Circular, mildly erythematous, pruritic plaque   Eyes: Conjunctivae and EOM are normal. Pupils are equal, round, and reactive to light.   Neck: Normal range of motion. Neck supple.   Pulmonary/Chest: Effort normal.   Neurological: He is alert and oriented to person, place, and time.   Skin: Skin is warm and dry. Rash noted.   Psychiatric: He has a normal mood and affect.   Vitals reviewed.    REVIEWED DATA:    Labs:   Lab Results   Component Value Date     04/29/2019    K 4.7 04/29/2019    AST 19 03/12/2019    ALT 27 03/12/2019    BUN 10 04/29/2019    CREATININE 0.96 04/29/2019    CREATININE 0.99 03/12/2019    EGFRIFNONA 78 04/29/2019    EGFRIFAFRI 94 04/29/2019       Lab Results   Component Value Date    HGBA1C 6.10 (H) 07/30/2019    HGBA1C 6.30 (H) 03/12/2019       Lab Results   Component Value Date    LDL 78 03/12/2019    HDL 74 (H) 03/12/2019    TRIG 49 03/12/2019    CHOLHDLRATIO 2.19 03/12/2019       No results found for: TSH, FREET4       Lab Results   Component Value Date    WBC 4.81 04/29/2019    HGB 13.3 04/29/2019    HGB 13.4 04/11/2019    HGB 13.3 03/12/2019     04/29/2019         Imaging:      Medical Tests:      Summary of old records / correspondence / consultant report:      Request outside records:   ______________________________________________________________________    ALLERGIES  No Known Allergies     MEDICATIONS  Current Outpatient Medications on File Prior to Visit   Medication Sig   • aspirin 81 MG EC tablet Take 81 mg by mouth Daily.   • atorvastatin (LIPITOR) 10 MG tablet Take 10 mg by mouth Daily. Pt is taking this every 2 days   • Cholecalciferol (VITAMIN D3 PO) Take  by  "mouth.   • clotrimazole (LOTRIMIN) 1 % cream Apply  topically to the appropriate area as directed 2 (Two) Times a Day.   • metFORMIN (GLUCOPHAGE) 500 MG tablet Take 2 tablets twice daily with meals.   • NON FORMULARY agiolax   Every day 2 spoons.     No current facility-administered medications on file prior to visit.        PFSH:     The following portions of the patient's history were reviewed and updated as appropriate: Allergies / Current Medications / Past Medical History / Surgical History / Social History / Family History    PROBLEM LIST   Patient Active Problem List   Diagnosis   • Hx of microcytic hypochromic anemia   • Encounter for screening for malignant neoplasm of colon   • Alpha thalassemia minor       PAST MEDICAL HISTORY  Past Medical History:   Diagnosis Date   • Anemia    • Diabetes (CMS/HCC)     prior doctor said \"watch for diabetes with diet\"   • Hyperlipidemia        SURGICAL HISTORY  Past Surgical History:   Procedure Laterality Date   • APPENDECTOMY  2004   • CHOLECYSTECTOMY     • COLONOSCOPY N/A 7/10/2019    Procedure: COLONOSCOPY to cecum and t.i. with saline lift and hot snare polypectomy wiht clip placement x1;  Surgeon: Miguel Kuhn MD;  Location: [facility] ENDOSCOPY;  Service: Gastroenterology       SOCIAL HISTORY  Social History     Socioeconomic History   • Marital status:      Spouse name: Not on file   • Number of children: Not on file   • Years of education: Not on file   • Highest education level: Not on file   Occupational History     Employer: RETIRED   Tobacco Use   • Smoking status: Never Smoker   • Smokeless tobacco: Never Used   Substance and Sexual Activity   • Alcohol use: No     Frequency: Never   • Drug use: Defer   • Sexual activity: Yes       FAMILY HISTORY  Family History   Problem Relation Age of Onset   • Diabetes Father    • Stroke Father    • Hypertension Father    • No Known Problems Mother    • No Known Problems Sister    • No Known Problems " Brother    • No Known Problems Brother    • No Known Problems Brother    • No Known Problems Brother    • No Known Problems Sister    • No Known Problems Sister    • No Known Problems Sister    • Anemia Other    • Prostate cancer Neg Hx    • Colon cancer Neg Hx

## 2019-08-23 ENCOUNTER — OFFICE VISIT (OUTPATIENT)
Dept: INTERNAL MEDICINE | Age: 69
End: 2019-08-23

## 2019-08-23 VITALS
HEART RATE: 89 BPM | DIASTOLIC BLOOD PRESSURE: 80 MMHG | WEIGHT: 166 LBS | OXYGEN SATURATION: 98 % | HEIGHT: 65 IN | BODY MASS INDEX: 27.66 KG/M2 | TEMPERATURE: 97.9 F | SYSTOLIC BLOOD PRESSURE: 120 MMHG

## 2019-08-23 DIAGNOSIS — G47.9 DISTURBANCE IN SLEEP BEHAVIOR: ICD-10-CM

## 2019-08-23 DIAGNOSIS — B35.4 TINEA CORPORIS: Primary | ICD-10-CM

## 2019-08-23 PROCEDURE — 99213 OFFICE O/P EST LOW 20 MIN: CPT | Performed by: NURSE PRACTITIONER

## 2019-08-23 RX ORDER — HYDROXYZINE HYDROCHLORIDE 25 MG/1
25 TABLET, FILM COATED ORAL 3 TIMES DAILY PRN
Qty: 20 TABLET | Refills: 0 | Status: SHIPPED | OUTPATIENT
Start: 2019-08-23

## 2019-08-23 RX ORDER — TERBINAFINE HYDROCHLORIDE 250 MG/1
250 TABLET ORAL DAILY
Qty: 14 TABLET | Refills: 0 | Status: SHIPPED | OUTPATIENT
Start: 2019-08-23

## 2019-08-23 NOTE — PATIENT INSTRUCTIONS
Benadryl 25 mg over the counter, use as needed for sleep. Ok to repeat if you wake up in the middle of the night.     First take Benadryl, if not working within 1-2 hours take Hydroxyzine tablet for sleep.     Good Sleep Habits (Hygiene):  - Stick to a regular sleep schedule-- even on weekends.   - Get regular daily exercise, even walking-- avoid exercise late in the evening within 2-3 hours of sleep.   - Go to bed only when you are sleepy.  - Put your worries away when you go to bed.  - Do something relaxing and enjoyable before bedtime (such as reading, meditation, soft music, etc.)  - Make your bedroom quiet and comfortable.   - Avoid large meals just before bedtime.   - Use your bedroom only for sleep and sexual activity.  - If you do not fall asleep within 15 to 20 minutes, get up and go to another room. Return to bed only when you feel drowsy.   - Remove your clock from sight.   - Do not nap during the day. If you must nap, do so alexandra for 30 minutes in the early afternoon.   - Avoid alcohol, nicotine, and caffeine.   - Avoid frequent use of sedatives.   - Spend time outdoors at the same time each day.   - Have your pharmacist check your medicines, in case any of them keep you from sleeping.  - Avoid bright lights from the TV, computers, phone screens, video games, etc. Before bed.     *Even if a medication is used for insomnia, you should still keep up good sleep habits to improve efficacy of the medication and sleep quality.

## 2020-02-24 ENCOUNTER — DOCUMENTATION (OUTPATIENT)
Dept: PHYSICAL THERAPY | Facility: CLINIC | Age: 70
End: 2020-02-24

## 2020-02-24 DIAGNOSIS — G89.29 CHRONIC LEFT SHOULDER PAIN: Primary | ICD-10-CM

## 2020-02-24 DIAGNOSIS — M75.02 ADHESIVE CAPSULITIS OF LEFT SHOULDER: ICD-10-CM

## 2020-02-24 DIAGNOSIS — M25.512 CHRONIC LEFT SHOULDER PAIN: Primary | ICD-10-CM

## 2021-03-22 ENCOUNTER — BULK ORDERING (OUTPATIENT)
Dept: CASE MANAGEMENT | Facility: OTHER | Age: 71
End: 2021-03-22

## 2021-03-22 DIAGNOSIS — Z23 IMMUNIZATION DUE: ICD-10-CM

## (undated) DEVICE — CANN NASL CO2 TRULINK W/O2 A/

## (undated) DEVICE — Device: Brand: DEFENDO AIR/WATER/SUCTION AND BIOPSY VALVE

## (undated) DEVICE — SNAR POLYP SENSATION STDOVL 27 240 BX40

## (undated) DEVICE — THE SINGLE USE ETRAP – POLYP TRAP IS USED FOR SUCTION RETRIEVAL OF ENDOSCOPICALLY REMOVED POLYPS.: Brand: ETRAP

## (undated) DEVICE — SINGLE-USE BIOPSY FORCEPS: Brand: RADIAL JAW 4

## (undated) DEVICE — PAD GRND REM POLYHESIVE A/ DISP

## (undated) DEVICE — TUBING, SUCTION, 1/4" X 10', STRAIGHT: Brand: MEDLINE

## (undated) DEVICE — THE TORRENT IRRIGATION SCOPE CONNECTOR IS USED WITH THE TORRENT IRRIGATION TUBING TO PROVIDE IRRIGATION FLUIDS SUCH AS STERILE WATER DURING GASTROINTESTINAL ENDOSCOPIC PROCEDURES WHEN USED IN CONJUNCTION WITH AN IRRIGATION PUMP (OR ELECTROSURGICAL UNIT).: Brand: TORRENT

## (undated) DEVICE — THE CARR-LOCKE INJECTION NEEDLE IS A SINGLE USE, DISPOSABLE, FLEXIBLE SHEATH INJECTION NEEDLE USED FOR THE INJECTION OF VARIOUS TYPES OF MEDIA THROUGH FLEXIBLE ENDOSCOPES.